# Patient Record
Sex: MALE | Race: WHITE | NOT HISPANIC OR LATINO
[De-identification: names, ages, dates, MRNs, and addresses within clinical notes are randomized per-mention and may not be internally consistent; named-entity substitution may affect disease eponyms.]

---

## 2017-02-03 ENCOUNTER — APPOINTMENT (OUTPATIENT)
Dept: PULMONOLOGY | Facility: CLINIC | Age: 67
End: 2017-02-03

## 2017-02-03 VITALS
RESPIRATION RATE: 15 BRPM | BODY MASS INDEX: 27.77 KG/M2 | SYSTOLIC BLOOD PRESSURE: 110 MMHG | OXYGEN SATURATION: 98 % | HEIGHT: 70 IN | HEART RATE: 63 BPM | DIASTOLIC BLOOD PRESSURE: 70 MMHG | WEIGHT: 194 LBS

## 2017-02-03 DIAGNOSIS — Z78.9 OTHER SPECIFIED HEALTH STATUS: ICD-10-CM

## 2017-02-03 DIAGNOSIS — Z80.9 FAMILY HISTORY OF MALIGNANT NEOPLASM, UNSPECIFIED: ICD-10-CM

## 2017-02-03 DIAGNOSIS — Z83.49 FAMILY HISTORY OF OTHER ENDOCRINE, NUTRITIONAL AND METABOLIC DISEASES: ICD-10-CM

## 2017-02-03 DIAGNOSIS — Z82.49 FAMILY HISTORY OF ISCHEMIC HEART DISEASE AND OTHER DISEASES OF THE CIRCULATORY SYSTEM: ICD-10-CM

## 2017-02-04 LAB
BASOPHILS # BLD AUTO: 0.11 K/UL
BASOPHILS NFR BLD AUTO: 1.5 %
EOSINOPHIL # BLD AUTO: 0.35 K/UL
EOSINOPHIL NFR BLD AUTO: 4.7 %
HCT VFR BLD CALC: 44.5 %
HGB BLD-MCNC: 14.6 G/DL
IGE SER-MCNC: <1 IU/ML
IMM GRANULOCYTES NFR BLD AUTO: 0.5 %
LYMPHOCYTES # BLD AUTO: 2.71 K/UL
LYMPHOCYTES NFR BLD AUTO: 36.2 %
MAN DIFF?: NORMAL
MCHC RBC-ENTMCNC: 32.5 PG
MCHC RBC-ENTMCNC: 32.8 GM/DL
MCV RBC AUTO: 99.1 FL
MONOCYTES # BLD AUTO: 0.71 K/UL
MONOCYTES NFR BLD AUTO: 9.5 %
NEUTROPHILS # BLD AUTO: 3.57 K/UL
NEUTROPHILS NFR BLD AUTO: 47.6 %
PLATELET # BLD AUTO: 383 K/UL
RBC # BLD: 4.49 M/UL
RBC # FLD: 14 %
T3FREE SERPL-MCNC: 2.71 PG/ML
T4 FREE SERPL-MCNC: 1.4 NG/DL
TSH SERPL-ACNC: 3.13 UIU/ML
WBC # FLD AUTO: 7.49 K/UL

## 2017-02-05 ENCOUNTER — RX RENEWAL (OUTPATIENT)
Age: 67
End: 2017-02-05

## 2017-02-05 LAB
24R-OH-CALCIDIOL SERPL-MCNC: 68 PG/ML
25(OH)D3 SERPL-MCNC: 20.3 NG/ML

## 2017-02-09 LAB
TESTOST BND SERPL-MCNC: 5.1 PG/ML
TESTOST SERPL-MCNC: 312.8 NG/DL

## 2017-02-13 ENCOUNTER — MEDICATION RENEWAL (OUTPATIENT)
Age: 67
End: 2017-02-13

## 2017-02-24 ENCOUNTER — APPOINTMENT (OUTPATIENT)
Dept: PULMONOLOGY | Facility: CLINIC | Age: 67
End: 2017-02-24

## 2017-02-28 ENCOUNTER — RESULT REVIEW (OUTPATIENT)
Age: 67
End: 2017-02-28

## 2017-02-28 ENCOUNTER — MEDICATION RENEWAL (OUTPATIENT)
Age: 67
End: 2017-02-28

## 2017-03-06 ENCOUNTER — APPOINTMENT (OUTPATIENT)
Dept: SLEEP CENTER | Facility: CLINIC | Age: 67
End: 2017-03-06

## 2017-03-06 ENCOUNTER — OUTPATIENT (OUTPATIENT)
Dept: OUTPATIENT SERVICES | Facility: HOSPITAL | Age: 67
LOS: 1 days | End: 2017-03-06
Payer: COMMERCIAL

## 2017-03-06 PROCEDURE — G0400: CPT

## 2017-03-10 DIAGNOSIS — G47.33 OBSTRUCTIVE SLEEP APNEA (ADULT) (PEDIATRIC): ICD-10-CM

## 2017-03-28 ENCOUNTER — APPOINTMENT (OUTPATIENT)
Dept: UROLOGY | Facility: CLINIC | Age: 67
End: 2017-03-28

## 2017-04-06 ENCOUNTER — APPOINTMENT (OUTPATIENT)
Dept: PULMONOLOGY | Facility: CLINIC | Age: 67
End: 2017-04-06

## 2017-04-06 VITALS
SYSTOLIC BLOOD PRESSURE: 123 MMHG | WEIGHT: 194 LBS | RESPIRATION RATE: 14 BRPM | HEART RATE: 67 BPM | HEIGHT: 70 IN | DIASTOLIC BLOOD PRESSURE: 70 MMHG | OXYGEN SATURATION: 96 % | BODY MASS INDEX: 27.77 KG/M2

## 2017-04-11 ENCOUNTER — RX RENEWAL (OUTPATIENT)
Age: 67
End: 2017-04-11

## 2017-04-19 ENCOUNTER — APPOINTMENT (OUTPATIENT)
Dept: HEMATOLOGY ONCOLOGY | Facility: CLINIC | Age: 67
End: 2017-04-19

## 2017-04-19 VITALS
OXYGEN SATURATION: 97 % | HEART RATE: 64 BPM | WEIGHT: 196 LBS | DIASTOLIC BLOOD PRESSURE: 56 MMHG | BODY MASS INDEX: 28.12 KG/M2 | TEMPERATURE: 97.9 F | SYSTOLIC BLOOD PRESSURE: 118 MMHG | RESPIRATION RATE: 12 BRPM

## 2017-04-21 LAB
ALBUMIN SERPL ELPH-MCNC: 4.7 G/DL
ALP BLD-CCNC: 57 U/L
ALT SERPL-CCNC: 28 U/L
ANION GAP SERPL CALC-SCNC: 18 MMOL/L
AST SERPL-CCNC: 25 U/L
BILIRUB SERPL-MCNC: 0.4 MG/DL
BUN SERPL-MCNC: 16 MG/DL
CALCIUM SERPL-MCNC: 10.7 MG/DL
CHLORIDE SERPL-SCNC: 101 MMOL/L
CO2 SERPL-SCNC: 24 MMOL/L
CREAT SERPL-MCNC: 0.84 MG/DL
GLUCOSE SERPL-MCNC: 80 MG/DL
LDH SERPL-CCNC: 182 U/L
POTASSIUM SERPL-SCNC: 4.6 MMOL/L
PROT SERPL-MCNC: 7.1 G/DL
SODIUM SERPL-SCNC: 143 MMOL/L
URATE SERPL-MCNC: 6.9 MG/DL

## 2017-06-18 ENCOUNTER — LABORATORY RESULT (OUTPATIENT)
Age: 67
End: 2017-06-18

## 2017-06-18 ENCOUNTER — RESULT REVIEW (OUTPATIENT)
Age: 67
End: 2017-06-18

## 2017-06-19 ENCOUNTER — APPOINTMENT (OUTPATIENT)
Dept: DERMATOLOGY | Facility: CLINIC | Age: 67
End: 2017-06-19

## 2017-06-19 VITALS — DIASTOLIC BLOOD PRESSURE: 82 MMHG | SYSTOLIC BLOOD PRESSURE: 132 MMHG

## 2017-06-19 DIAGNOSIS — L73.8 OTHER SPECIFIED FOLLICULAR DISORDERS: ICD-10-CM

## 2017-06-19 DIAGNOSIS — D48.5 NEOPLASM OF UNCERTAIN BEHAVIOR OF SKIN: ICD-10-CM

## 2017-06-19 RX ORDER — HYDROCODONE BITARTRATE AND HOMATROPINE METHYLBROMIDE 5; 1.5 MG/5ML; MG/5ML
5-1.5 SYRUP ORAL
Qty: 473 | Refills: 0 | Status: DISCONTINUED | COMMUNITY
Start: 2017-04-11 | End: 2017-06-19

## 2017-06-19 RX ORDER — ERGOCALCIFEROL 1.25 MG/1
1.25 MG CAPSULE, LIQUID FILLED ORAL
Qty: 6 | Refills: 1 | Status: DISCONTINUED | COMMUNITY
Start: 2017-02-05 | End: 2017-06-19

## 2017-06-19 RX ORDER — DESLORATADINE 5 MG/1
5 TABLET ORAL DAILY
Qty: 90 | Refills: 1 | Status: DISCONTINUED | COMMUNITY
Start: 2017-04-06 | End: 2017-06-19

## 2017-06-19 RX ORDER — RANITIDINE HYDROCHLORIDE 300 MG/1
300 TABLET, FILM COATED ORAL
Qty: 3 | Refills: 1 | Status: DISCONTINUED | COMMUNITY
Start: 2017-02-03 | End: 2017-06-19

## 2017-06-19 RX ORDER — AZITHROMYCIN 250 MG/1
250 TABLET, FILM COATED ORAL
Qty: 6 | Refills: 0 | Status: DISCONTINUED | COMMUNITY
Start: 2016-03-05 | End: 2017-06-19

## 2017-06-19 RX ORDER — BUDESONIDE 180 UG/1
180 AEROSOL, POWDER RESPIRATORY (INHALATION) TWICE DAILY
Qty: 3 | Refills: 1 | Status: DISCONTINUED | COMMUNITY
Start: 2017-04-06 | End: 2017-06-19

## 2017-06-19 RX ORDER — ALBUTEROL SULFATE 90 UG/1
108 (90 BASE) AEROSOL, METERED RESPIRATORY (INHALATION)
Qty: 1 | Refills: 1 | Status: DISCONTINUED | COMMUNITY
Start: 2017-02-28 | End: 2017-06-19

## 2017-06-19 RX ORDER — HYDROCODONE BITARTRATE AND HOMATROPINE METHYLBROMIDE 5; 1.5 MG/5ML; MG/5ML
5-1.5 SYRUP ORAL
Qty: 473 | Refills: 0 | Status: DISCONTINUED | COMMUNITY
Start: 2017-04-06 | End: 2017-06-19

## 2017-06-19 RX ORDER — DESLORATADINE 5 MG/1
5 TABLET ORAL DAILY
Qty: 90 | Refills: 1 | Status: DISCONTINUED | COMMUNITY
Start: 2017-02-03 | End: 2017-06-19

## 2017-09-11 ENCOUNTER — APPOINTMENT (OUTPATIENT)
Dept: INTERNAL MEDICINE | Facility: CLINIC | Age: 67
End: 2017-09-11

## 2017-09-19 ENCOUNTER — APPOINTMENT (OUTPATIENT)
Dept: PULMONOLOGY | Facility: CLINIC | Age: 67
End: 2017-09-19
Payer: COMMERCIAL

## 2017-09-19 VITALS
SYSTOLIC BLOOD PRESSURE: 124 MMHG | DIASTOLIC BLOOD PRESSURE: 70 MMHG | RESPIRATION RATE: 17 BRPM | BODY MASS INDEX: 27.77 KG/M2 | OXYGEN SATURATION: 95 % | HEART RATE: 66 BPM | HEIGHT: 70 IN | WEIGHT: 194 LBS

## 2017-09-19 PROCEDURE — 94010 BREATHING CAPACITY TEST: CPT

## 2017-09-19 PROCEDURE — 99214 OFFICE O/P EST MOD 30 MIN: CPT | Mod: 25

## 2017-09-23 LAB
T3FREE SERPL-MCNC: 2.73 PG/ML
T4 FREE SERPL-MCNC: 1.5 NG/DL
TSH SERPL-ACNC: 3.04 UIU/ML

## 2017-09-24 LAB
24R-OH-CALCIDIOL SERPL-MCNC: 50.6 PG/ML
25(OH)D3 SERPL-MCNC: 33.7 NG/ML

## 2017-09-26 LAB
TESTOST BND SERPL-MCNC: 6.9 PG/ML
TESTOST SERPL-MCNC: 370.9 NG/DL

## 2017-09-27 ENCOUNTER — RX RENEWAL (OUTPATIENT)
Age: 67
End: 2017-09-27

## 2017-09-27 ENCOUNTER — APPOINTMENT (OUTPATIENT)
Dept: INTERNAL MEDICINE | Facility: CLINIC | Age: 67
End: 2017-09-27
Payer: COMMERCIAL

## 2017-09-27 VITALS
SYSTOLIC BLOOD PRESSURE: 140 MMHG | WEIGHT: 196 LBS | OXYGEN SATURATION: 99 % | DIASTOLIC BLOOD PRESSURE: 80 MMHG | HEIGHT: 70 IN | HEART RATE: 69 BPM | BODY MASS INDEX: 28.06 KG/M2

## 2017-09-27 PROCEDURE — 90670 PCV13 VACCINE IM: CPT

## 2017-09-27 PROCEDURE — 99387 INIT PM E/M NEW PAT 65+ YRS: CPT | Mod: 25

## 2017-09-27 PROCEDURE — G0008: CPT

## 2017-09-27 PROCEDURE — 90662 IIV NO PRSV INCREASED AG IM: CPT

## 2017-09-27 PROCEDURE — 90472 IMMUNIZATION ADMIN EACH ADD: CPT

## 2017-10-04 LAB
CHOLEST SERPL-MCNC: 177 MG/DL
CHOLEST/HDLC SERPL: 5.5 RATIO
HBA1C MFR BLD HPLC: 5.4 %
HDLC SERPL-MCNC: 32 MG/DL
LDLC SERPL CALC-MCNC: 110 MG/DL
PSA SERPL-MCNC: 1.59 NG/ML
TRIGL SERPL-MCNC: 175 MG/DL

## 2017-10-18 ENCOUNTER — APPOINTMENT (OUTPATIENT)
Dept: HEMATOLOGY ONCOLOGY | Facility: CLINIC | Age: 67
End: 2017-10-18
Payer: COMMERCIAL

## 2017-10-18 ENCOUNTER — LABORATORY RESULT (OUTPATIENT)
Age: 67
End: 2017-10-18

## 2017-10-18 VITALS
SYSTOLIC BLOOD PRESSURE: 135 MMHG | OXYGEN SATURATION: 97 % | BODY MASS INDEX: 28.12 KG/M2 | DIASTOLIC BLOOD PRESSURE: 87 MMHG | TEMPERATURE: 98.2 F | HEART RATE: 72 BPM | WEIGHT: 196 LBS | RESPIRATION RATE: 14 BRPM

## 2017-10-18 PROCEDURE — 99215 OFFICE O/P EST HI 40 MIN: CPT

## 2017-10-18 PROCEDURE — 85025 COMPLETE CBC W/AUTO DIFF WBC: CPT

## 2017-10-19 LAB
ALBUMIN SERPL ELPH-MCNC: 4.8 G/DL
ALP BLD-CCNC: 53 U/L
ALT SERPL-CCNC: 39 U/L
ANION GAP SERPL CALC-SCNC: 15 MMOL/L
AST SERPL-CCNC: 25 U/L
BILIRUB SERPL-MCNC: 0.5 MG/DL
BUN SERPL-MCNC: 18 MG/DL
CALCIUM SERPL-MCNC: 11 MG/DL
CHLORIDE SERPL-SCNC: 103 MMOL/L
CO2 SERPL-SCNC: 26 MMOL/L
CREAT SERPL-MCNC: 0.97 MG/DL
GLUCOSE SERPL-MCNC: 105 MG/DL
LDH SERPL-CCNC: 168 U/L
POTASSIUM SERPL-SCNC: 4.8 MMOL/L
PROT SERPL-MCNC: 7.4 G/DL
SODIUM SERPL-SCNC: 144 MMOL/L
URATE SERPL-MCNC: 8.4 MG/DL

## 2017-10-23 LAB — PTH RELATED PROT SERPL-MCNC: <1.1 PMOL/L

## 2017-11-09 ENCOUNTER — APPOINTMENT (OUTPATIENT)
Dept: PULMONOLOGY | Facility: CLINIC | Age: 67
End: 2017-11-09

## 2017-11-21 ENCOUNTER — APPOINTMENT (OUTPATIENT)
Dept: INTERNAL MEDICINE | Facility: CLINIC | Age: 67
End: 2017-11-21
Payer: COMMERCIAL

## 2017-11-21 VITALS
HEART RATE: 74 BPM | SYSTOLIC BLOOD PRESSURE: 134 MMHG | WEIGHT: 196 LBS | OXYGEN SATURATION: 97 % | DIASTOLIC BLOOD PRESSURE: 82 MMHG | BODY MASS INDEX: 28.06 KG/M2 | HEIGHT: 70 IN

## 2017-11-21 DIAGNOSIS — M25.519 PAIN IN UNSPECIFIED SHOULDER: ICD-10-CM

## 2017-11-21 DIAGNOSIS — Z87.898 PERSONAL HISTORY OF OTHER SPECIFIED CONDITIONS: ICD-10-CM

## 2017-11-21 DIAGNOSIS — Z92.29 PERSONAL HISTORY OF OTHER DRUG THERAPY: ICD-10-CM

## 2017-11-21 DIAGNOSIS — G47.30 SLEEP APNEA, UNSPECIFIED: ICD-10-CM

## 2017-11-21 DIAGNOSIS — Z87.09 PERSONAL HISTORY OF OTHER DISEASES OF THE RESPIRATORY SYSTEM: ICD-10-CM

## 2017-11-21 DIAGNOSIS — J40 BRONCHITIS, NOT SPECIFIED AS ACUTE OR CHRONIC: ICD-10-CM

## 2017-11-21 DIAGNOSIS — L85.3 XEROSIS CUTIS: ICD-10-CM

## 2017-11-21 DIAGNOSIS — Z87.39 PERSONAL HISTORY OF OTHER DISEASES OF THE MUSCULOSKELETAL SYSTEM AND CONNECTIVE TISSUE: ICD-10-CM

## 2017-11-21 DIAGNOSIS — Z12.83 ENCOUNTER FOR SCREENING FOR MALIGNANT NEOPLASM OF SKIN: ICD-10-CM

## 2017-11-21 DIAGNOSIS — T88.7XXA UNSPECIFIED ADVERSE EFFECT OF DRUG OR MEDICAMENT, INITIAL ENCOUNTER: ICD-10-CM

## 2017-11-21 PROCEDURE — 99213 OFFICE O/P EST LOW 20 MIN: CPT

## 2017-12-21 ENCOUNTER — APPOINTMENT (OUTPATIENT)
Dept: PULMONOLOGY | Facility: CLINIC | Age: 67
End: 2017-12-21
Payer: COMMERCIAL

## 2017-12-21 VITALS
OXYGEN SATURATION: 97 % | HEART RATE: 67 BPM | WEIGHT: 197 LBS | RESPIRATION RATE: 16 BRPM | DIASTOLIC BLOOD PRESSURE: 70 MMHG | HEIGHT: 67 IN | BODY MASS INDEX: 30.92 KG/M2 | SYSTOLIC BLOOD PRESSURE: 130 MMHG

## 2017-12-21 DIAGNOSIS — Z87.891 PERSONAL HISTORY OF NICOTINE DEPENDENCE: ICD-10-CM

## 2017-12-21 PROCEDURE — 99214 OFFICE O/P EST MOD 30 MIN: CPT

## 2018-01-10 ENCOUNTER — RX RENEWAL (OUTPATIENT)
Age: 68
End: 2018-01-10

## 2018-01-10 RX ORDER — METHYLPREDNISOLONE 4 MG/1
4 TABLET ORAL
Qty: 1 | Refills: 0 | Status: DISCONTINUED | COMMUNITY
Start: 2017-12-27 | End: 2018-01-10

## 2018-01-10 RX ORDER — METHYLPREDNISOLONE 4 MG/1
4 TABLET ORAL
Qty: 1 | Refills: 0 | Status: DISCONTINUED | COMMUNITY
Start: 2017-12-21 | End: 2018-01-10

## 2018-01-19 ENCOUNTER — APPOINTMENT (OUTPATIENT)
Dept: PULMONOLOGY | Facility: CLINIC | Age: 68
End: 2018-01-19
Payer: COMMERCIAL

## 2018-01-19 VITALS
WEIGHT: 198 LBS | HEIGHT: 67 IN | SYSTOLIC BLOOD PRESSURE: 126 MMHG | HEART RATE: 67 BPM | BODY MASS INDEX: 31.08 KG/M2 | DIASTOLIC BLOOD PRESSURE: 80 MMHG | OXYGEN SATURATION: 97 %

## 2018-01-19 DIAGNOSIS — Z87.09 PERSONAL HISTORY OF OTHER DISEASES OF THE RESPIRATORY SYSTEM: ICD-10-CM

## 2018-01-19 PROCEDURE — 99214 OFFICE O/P EST MOD 30 MIN: CPT | Mod: 25

## 2018-01-19 PROCEDURE — 94010 BREATHING CAPACITY TEST: CPT

## 2018-01-19 RX ORDER — BECLOMETHASONE DIPROPIONATE 80 UG/1
80 AEROSOL, METERED RESPIRATORY (INHALATION) TWICE DAILY
Qty: 3 | Refills: 1 | Status: ACTIVE | COMMUNITY
Start: 2018-01-19 | End: 1900-01-01

## 2018-04-18 ENCOUNTER — APPOINTMENT (OUTPATIENT)
Dept: HEMATOLOGY ONCOLOGY | Facility: CLINIC | Age: 68
End: 2018-04-18
Payer: COMMERCIAL

## 2018-04-18 VITALS
BODY MASS INDEX: 30.7 KG/M2 | HEART RATE: 69 BPM | OXYGEN SATURATION: 97 % | DIASTOLIC BLOOD PRESSURE: 82 MMHG | TEMPERATURE: 97.8 F | RESPIRATION RATE: 14 BRPM | SYSTOLIC BLOOD PRESSURE: 138 MMHG | WEIGHT: 196 LBS

## 2018-04-18 PROCEDURE — 85025 COMPLETE CBC W/AUTO DIFF WBC: CPT

## 2018-04-18 PROCEDURE — 99215 OFFICE O/P EST HI 40 MIN: CPT

## 2018-04-18 RX ORDER — AZITHROMYCIN 500 MG/1
500 TABLET, FILM COATED ORAL
Qty: 5 | Refills: 0 | Status: DISCONTINUED | COMMUNITY
Start: 2017-12-21 | End: 2018-04-18

## 2018-04-18 RX ORDER — ASPARTIC ACID
POWDER (GRAM) MISCELLANEOUS
Refills: 0 | Status: ACTIVE | COMMUNITY

## 2018-04-18 RX ORDER — CARBINOXAMINE MALEATE 4 MG/1
4 TABLET ORAL
Qty: 270 | Refills: 1 | Status: DISCONTINUED | COMMUNITY
Start: 2018-01-19 | End: 2018-04-18

## 2018-04-18 RX ORDER — HYDROCODONE BITARTRATE AND HOMATROPINE METHYLBROMIDE 5; 1.5 MG/5ML; MG/5ML
5-1.5 SYRUP ORAL
Qty: 1 | Refills: 0 | Status: DISCONTINUED | COMMUNITY
Start: 2017-09-19 | End: 2018-04-18

## 2018-04-18 RX ORDER — HYDROCODONE BITARTRATE AND HOMATROPINE METHYLBROMIDE 5; 1.5 MG/5ML; MG/5ML
5-1.5 SYRUP ORAL
Qty: 800 | Refills: 0 | Status: DISCONTINUED | COMMUNITY
Start: 2018-01-10 | End: 2018-04-18

## 2018-04-18 RX ORDER — AZELASTINE HYDROCHLORIDE AND FLUTICASONE PROPIONATE 137; 50 UG/1; UG/1
137-50 SPRAY, METERED NASAL
Qty: 3 | Refills: 1 | Status: DISCONTINUED | COMMUNITY
Start: 2017-09-19 | End: 2018-04-18

## 2018-04-18 RX ORDER — ZN/PYG AFRI/NETTLE/SAW PALMET
CAPSULE ORAL
Refills: 0 | Status: ACTIVE | COMMUNITY

## 2018-04-18 RX ORDER — FLUTICASONE PROPIONATE 50 UG/1
50 SPRAY, METERED NASAL TWICE DAILY
Qty: 3 | Refills: 1 | Status: DISCONTINUED | COMMUNITY
Start: 2017-09-27 | End: 2018-04-18

## 2018-04-18 RX ORDER — ERGOCALCIFEROL 1.25 MG/1
1.25 MG CAPSULE, LIQUID FILLED ORAL
Qty: 6 | Refills: 1 | Status: DISCONTINUED | COMMUNITY
Start: 2018-01-19 | End: 2018-04-18

## 2018-04-18 RX ORDER — OSELTAMIVIR PHOSPHATE 75 MG/1
75 CAPSULE ORAL TWICE DAILY
Qty: 10 | Refills: 0 | Status: DISCONTINUED | COMMUNITY
Start: 2018-01-19 | End: 2018-04-18

## 2018-04-18 RX ORDER — COLD-HOT PACK
EACH MISCELLANEOUS
Refills: 0 | Status: ACTIVE | COMMUNITY

## 2018-04-23 LAB
ALBUMIN SERPL ELPH-MCNC: 4.7 G/DL
ALP BLD-CCNC: 55 U/L
ALT SERPL-CCNC: 37 U/L
ANION GAP SERPL CALC-SCNC: 11 MMOL/L
AST SERPL-CCNC: 26 U/L
BILIRUB SERPL-MCNC: 0.5 MG/DL
BUN SERPL-MCNC: 16 MG/DL
CALCIUM SERPL-MCNC: 11.5 MG/DL
CHLORIDE SERPL-SCNC: 103 MMOL/L
CO2 SERPL-SCNC: 29 MMOL/L
CREAT SERPL-MCNC: 0.97 MG/DL
GLUCOSE SERPL-MCNC: 103 MG/DL
LDH SERPL-CCNC: 169 U/L
POTASSIUM SERPL-SCNC: 5.9 MMOL/L
PROT SERPL-MCNC: 7.2 G/DL
SODIUM SERPL-SCNC: 143 MMOL/L
URATE SERPL-MCNC: 7.5 MG/DL

## 2018-04-24 ENCOUNTER — APPOINTMENT (OUTPATIENT)
Dept: HEMATOLOGY ONCOLOGY | Facility: CLINIC | Age: 68
End: 2018-04-24

## 2018-04-25 LAB
ALBUMIN SERPL ELPH-MCNC: 4.5 G/DL
ALP BLD-CCNC: 48 U/L
ALT SERPL-CCNC: 33 U/L
ANION GAP SERPL CALC-SCNC: 13 MMOL/L
AST SERPL-CCNC: 23 U/L
BILIRUB SERPL-MCNC: 0.5 MG/DL
BUN SERPL-MCNC: 13 MG/DL
CALCIUM SERPL-MCNC: 10.8 MG/DL
CHLORIDE SERPL-SCNC: 103 MMOL/L
CO2 SERPL-SCNC: 27 MMOL/L
CREAT SERPL-MCNC: 0.86 MG/DL
GLUCOSE SERPL-MCNC: 90 MG/DL
POTASSIUM SERPL-SCNC: 5.5 MMOL/L
PROT SERPL-MCNC: 7 G/DL
SODIUM SERPL-SCNC: 143 MMOL/L

## 2018-05-16 ENCOUNTER — RX RENEWAL (OUTPATIENT)
Age: 68
End: 2018-05-16

## 2018-05-22 ENCOUNTER — RX RENEWAL (OUTPATIENT)
Age: 68
End: 2018-05-22

## 2018-07-13 ENCOUNTER — RESULT REVIEW (OUTPATIENT)
Age: 68
End: 2018-07-13

## 2018-07-17 ENCOUNTER — APPOINTMENT (OUTPATIENT)
Dept: ORTHOPEDIC SURGERY | Facility: CLINIC | Age: 68
End: 2018-07-17
Payer: COMMERCIAL

## 2018-07-17 ENCOUNTER — RX RENEWAL (OUTPATIENT)
Age: 68
End: 2018-07-17

## 2018-07-17 ENCOUNTER — TRANSCRIPTION ENCOUNTER (OUTPATIENT)
Age: 68
End: 2018-07-17

## 2018-07-17 VITALS
WEIGHT: 190 LBS | HEIGHT: 67 IN | SYSTOLIC BLOOD PRESSURE: 148 MMHG | DIASTOLIC BLOOD PRESSURE: 68 MMHG | BODY MASS INDEX: 29.82 KG/M2 | HEART RATE: 62 BPM

## 2018-07-17 PROCEDURE — 99214 OFFICE O/P EST MOD 30 MIN: CPT

## 2018-07-17 PROCEDURE — 73564 X-RAY EXAM KNEE 4 OR MORE: CPT | Mod: RT

## 2018-07-17 RX ORDER — HYDROCODONE BITARTRATE AND HOMATROPINE METHYLBROMIDE 5; 1.5 MG/5ML; MG/5ML
5-1.5 SYRUP ORAL
Qty: 1500 | Refills: 0 | Status: ACTIVE | COMMUNITY
Start: 2018-07-17 | End: 1900-01-01

## 2018-09-11 ENCOUNTER — RX RENEWAL (OUTPATIENT)
Age: 68
End: 2018-09-11

## 2018-09-27 ENCOUNTER — NON-APPOINTMENT (OUTPATIENT)
Age: 68
End: 2018-09-27

## 2018-09-27 ENCOUNTER — APPOINTMENT (OUTPATIENT)
Dept: PULMONOLOGY | Facility: CLINIC | Age: 68
End: 2018-09-27
Payer: COMMERCIAL

## 2018-09-27 VITALS
OXYGEN SATURATION: 97 % | SYSTOLIC BLOOD PRESSURE: 130 MMHG | HEIGHT: 70 IN | BODY MASS INDEX: 28.2 KG/M2 | HEART RATE: 66 BPM | WEIGHT: 197 LBS | DIASTOLIC BLOOD PRESSURE: 80 MMHG

## 2018-09-27 PROCEDURE — 99214 OFFICE O/P EST MOD 30 MIN: CPT | Mod: 25

## 2018-09-27 PROCEDURE — 94010 BREATHING CAPACITY TEST: CPT

## 2018-09-27 RX ORDER — OLOPATADINE HYDROCHLORIDE 665 UG/1
0.6 SPRAY, METERED NASAL
Qty: 3 | Refills: 1 | Status: ACTIVE | COMMUNITY
Start: 2018-09-27 | End: 1900-01-01

## 2018-09-27 NOTE — ADDENDUM
[FreeTextEntry1] : All medical record entries made by enmanuel Salmeron were at Dr. Jose Martin Angulo's, direction and personally dictated by me on (9/27/2018). I have reviewed the chart and agree that the record accurately reflects my personal performance of the history, physical exam, assessment and plan. I have also personally directed, reviewed, and agree with the discharge instructions.

## 2018-09-27 NOTE — HISTORY OF PRESENT ILLNESS
[FreeTextEntry1] : Mr. Acosta is a 68 year old male with a history of allergic rhinitis, cough, asthma, eosinophilia, GERD, low vitamin D/testosterone, non allergic rhinitis, PND, sinus congestion, sleep apnea and viral sinusitis presenting to the office today for a follow up visit. Her chief complaint is allergy symptoms\par -he reports that this past summer season he has been struggling with his allergies, as he has had a frequent post nasal drip and cough\par -he reports that he has been trying to exercise at the gym regularly with no limitations\par -he notes that he has been using his cough medicine at night, however he typically coughs the most during the morning\par -he states that his bowels have been regular\par -he reports that his sense of taste \par -he reports that he has been sleeping well, and that he has been using his oral appliance\par -he states that his weight has been stable\par -he states that his nasal sprays have not been relieving his nasal congestion \par -he denies any headaches, nausea, vomiting, fever, chills, sweats, chest pain, chest pressure, diarrhea, constipation, dysphagia, dizziness, leg swelling, leg pain, itchy eyes, itchy ears, heartburn, reflux, or sour taste in the mouth.

## 2018-09-27 NOTE — REASON FOR VISIT
[Follow-Up] : a follow-up visit [FreeTextEntry1] : allergic rhinitis, cough, asthma, eosinophilia, GERD, low vitamin D/testosterone, non allergic rhinitis, PND, sinus congestion, sleep apnea and viral sinusitis

## 2018-09-27 NOTE — PROCEDURE
[FreeTextEntry1] : PFT - spi reveals normal flows; FEV1 is 3.48 which is 102% of predicted, abnormal inspiratory limb

## 2018-09-27 NOTE — REVIEW OF SYSTEMS
[Negative] : Sleep Disorder [Nasal Congestion] : nasal congestion [As Noted in HPI] : as noted in HPI [Cough] : cough

## 2018-09-27 NOTE — ASSESSMENT
[FreeTextEntry1] : Mr. Acosta is a 68 year old male with a history of RADs / allergy sinus / CLL / GERD  who presents to the office for a follow up visit. \par \par His shortness of breath (quiet) is multifactorial due to:\par -post nasal drip syndrome\par -RADS\par -eosinophilic bronchitis\par -GERD\par -CLL: f/u with Dr. Martin and Dr. Trev Gastelum\par \par problem 1: post nasal drip\par -continue to use saline\par -add Olopatadine 0.6% 1 sniff BID \par -add Flonase 1 sniff/nostril BID \par -continue Clarinex 5 mg before bed\par -recommended to use "Navage" nasal rinse device \par -continue Palgic 4 mg up to TID \par \par -Environmental measures for allergies were encouraged including mattress and pillow cover, air purifier, and environmental controls.\par \par problem 2: RADS\par -continue to use Ventolin PRN and before exercise\par \par -Inhaler technique reviewed as well as oral hygiene techniques reviewed with patient. Avoidance of cold air, extremes of temperature, rescue inhaler should be used before exercise. Order of medication reviewed with patient. Recommended use of a cool mist humidifier in the bedroom. 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 It sometimes develops following a URI, herpes zoster outbreak in pharynx or thyroid or cervical spine injury. However, many patients have no identifiable antecedent explanation. \par Any cough greater than three weeks duration-differential diagnosis includes-asthma, upper airway cough syndrome, post nasal drip syndrome, gastroesophageal reflux, laryngopharyngeal reflux, cardiac disease (congestive heart failure, medicines, effects, etc), medication effects (b-blockers, ace inhibitors, ARBs, glaucoma meds, etc.), smoking, infectious, multifactorial, etc \par \par problem 4: eosinophilic bronchitis\par -being recommended to use Ventolin PRN and before exercise/Symbicort\par -continue Symbicort 160 at 2 inhalations BID prn\par -continue QVar 80 at 2 inhalations BID\par \par problem 5: GERD\par -continue to use Zantac 300 mg QHS\par \par -Rule of 2s: avoid eating too much, eating too late, eating too spicy, eating two hours before bed\par -Things to avoid including overeating, spicy foods, tight clothing, eating within three hours of bed, this list is not all inclusive. \par -For treatment of reflux, possible options discussed including diet control, H2 blockers, PPIs, as well as coating motility agents discussed as treatment options. Timing of meals and proximity of last meal to sleep were discussed. If symptoms persist, a formal gastrointestinal evaluation is needed.\par \par problem 6: low vitamin D\par -continue to use supplemental vitamin D supplementation\par \par -Has been associated with asthma exacerbations and increased allergic symptoms. The goal based on recent information is maintaining levels between 50-70 and low normal is 30. Recommended 50,000 units every two weeks to once a month depending on the level. \par \par problem 7: OSAS\par -sleep apnea revealed moderate sleep apnea\par -referred to Dr. Sivakumar Casarez and Dr. Okeefe for an oral appliance \par \par -Treatment options discussed including CPAP/BiPAP machine, oral appliance, ProVent therapy, Oxy-Aid by Respitec, new technologies, or positional sleep.Recommended use of the CPAP machine for moderate (AHI >15), moderate to severe (AHI 15-30) and severe patients (AHI > 30). Recommended weight loss which can reduce AHI especially in weight loss of greater than 5% of BMI. Positional sleep is recommended in those with low AHI, low-moderate BMI, and younger age. For severe sleep apnea, the hypoglossal nerve stimulator was recommended as well. \par -Sleep apnea is associated with adverse clinical consequences which an affect most organ systems.  Cardiovascular disease risk includes arrhythmias, atrial fibrillation, hypertension, coronary artery disease, and stroke. Metabolic disorders include diabetes type 2, non-alcoholic fatty liver disease. Mood disorder especially depression; and cognitive decline especially in the elderly. Associations with  chronic reflux/Kyle’s esophagus some but not all inclusive. \par -Reasons to assess this include arousal consistent with hypopnea; respiratory events most prominent in REM sleep or supine position; therefore sleep staging and body position are important for accurate diagnosis and estimation of AHI. \par \par problem 8: overweight\par -Weight loss, exercise, and diet control were discussed and are highly encouraged. Treatment options were given such as, aqua therapy, and contacting a nutritionist. Recommended to use the elliptical, stationary bike, less use of treadmill.  Obesity is associated with worsening asthma, shortness of breath, and potential for cardiac disease, diabetes, and other underlying medical conditions. \par \par problem 9: health maintenance \par -recommended yearly flu shot after October 15\par -recommended strep pneumonia vaccines: Prevnar-13 vaccine, followed by Pneumo vaccine 23 one year following\par -recommended early intervention for URIs\par -recommended regular osteoporosis evaluations\par -recommended early dermatological evaluations\par -recommended after the age of 50 to the age of 70, colonoscopy every 5 years \par \par F/U in 6 months\par He is encouraged to call with any changes, concerns, or questions.

## 2018-10-02 ENCOUNTER — OTHER (OUTPATIENT)
Age: 68
End: 2018-10-02

## 2018-10-03 ENCOUNTER — LABORATORY RESULT (OUTPATIENT)
Age: 68
End: 2018-10-03

## 2018-10-03 ENCOUNTER — APPOINTMENT (OUTPATIENT)
Dept: HEMATOLOGY ONCOLOGY | Facility: CLINIC | Age: 68
End: 2018-10-03
Payer: COMMERCIAL

## 2018-10-03 VITALS
OXYGEN SATURATION: 97 % | RESPIRATION RATE: 14 BRPM | SYSTOLIC BLOOD PRESSURE: 124 MMHG | BODY MASS INDEX: 28.12 KG/M2 | HEART RATE: 61 BPM | DIASTOLIC BLOOD PRESSURE: 72 MMHG | WEIGHT: 196 LBS

## 2018-10-03 PROCEDURE — 85025 COMPLETE CBC W/AUTO DIFF WBC: CPT

## 2018-10-03 PROCEDURE — 99215 OFFICE O/P EST HI 40 MIN: CPT

## 2018-10-15 ENCOUNTER — TRANSCRIPTION ENCOUNTER (OUTPATIENT)
Age: 68
End: 2018-10-15

## 2018-10-17 ENCOUNTER — FORM ENCOUNTER (OUTPATIENT)
Age: 68
End: 2018-10-17

## 2018-10-18 ENCOUNTER — OUTPATIENT (OUTPATIENT)
Dept: OUTPATIENT SERVICES | Facility: HOSPITAL | Age: 68
LOS: 1 days | End: 2018-10-18
Payer: COMMERCIAL

## 2018-10-18 ENCOUNTER — APPOINTMENT (OUTPATIENT)
Dept: RADIOLOGY | Facility: CLINIC | Age: 68
End: 2018-10-18
Payer: COMMERCIAL

## 2018-10-18 ENCOUNTER — LABORATORY RESULT (OUTPATIENT)
Age: 68
End: 2018-10-18

## 2018-10-18 ENCOUNTER — APPOINTMENT (OUTPATIENT)
Dept: INTERNAL MEDICINE | Facility: CLINIC | Age: 68
End: 2018-10-18
Payer: COMMERCIAL

## 2018-10-18 VITALS
HEART RATE: 68 BPM | HEIGHT: 70 IN | WEIGHT: 194 LBS | BODY MASS INDEX: 27.77 KG/M2 | SYSTOLIC BLOOD PRESSURE: 142 MMHG | OXYGEN SATURATION: 98 % | TEMPERATURE: 98.4 F | DIASTOLIC BLOOD PRESSURE: 64 MMHG

## 2018-10-18 DIAGNOSIS — R50.9 FEVER, UNSPECIFIED: ICD-10-CM

## 2018-10-18 PROCEDURE — 71046 X-RAY EXAM CHEST 2 VIEWS: CPT | Mod: 26

## 2018-10-18 PROCEDURE — 71046 X-RAY EXAM CHEST 2 VIEWS: CPT

## 2018-10-18 PROCEDURE — 99213 OFFICE O/P EST LOW 20 MIN: CPT

## 2018-10-22 ENCOUNTER — RX RENEWAL (OUTPATIENT)
Age: 68
End: 2018-10-22

## 2018-10-22 LAB
ANION GAP SERPL CALC-SCNC: 15 MMOL/L
APPEARANCE: CLEAR
BACTERIA UR CULT: NORMAL
BACTERIA: NEGATIVE
BASOPHILS # BLD AUTO: 0.06 K/UL
BASOPHILS NFR BLD AUTO: 0.9 %
BILIRUBIN URINE: NEGATIVE
BLOOD URINE: NEGATIVE
BUN SERPL-MCNC: 12 MG/DL
CALCIUM SERPL-MCNC: 10.4 MG/DL
CHLORIDE SERPL-SCNC: 101 MMOL/L
CO2 SERPL-SCNC: 25 MMOL/L
COLOR: ABNORMAL
CREAT SERPL-MCNC: 0.76 MG/DL
EOSINOPHIL # BLD AUTO: 0.03 K/UL
EOSINOPHIL NFR BLD AUTO: 0.5 %
ERYTHROCYTE [SEDIMENTATION RATE] IN BLOOD BY WESTERGREN METHOD: 10 MM/HR
GLUCOSE QUALITATIVE U: NEGATIVE MG/DL
GLUCOSE SERPL-MCNC: 108 MG/DL
HCT VFR BLD CALC: 44.5 %
HGB BLD-MCNC: 14.7 G/DL
HYALINE CASTS: 1 /LPF
IMM GRANULOCYTES NFR BLD AUTO: 0.6 %
KETONES URINE: ABNORMAL
LEUKOCYTE ESTERASE URINE: NEGATIVE
LYMPHOCYTES # BLD AUTO: 2.17 K/UL
LYMPHOCYTES NFR BLD AUTO: 33.6 %
MAN DIFF?: NORMAL
MCHC RBC-ENTMCNC: 33 GM/DL
MCHC RBC-ENTMCNC: 33.3 PG
MCV RBC AUTO: 100.7 FL
MICROSCOPIC-UA: NORMAL
MONOCYTES # BLD AUTO: 1.06 K/UL
MONOCYTES NFR BLD AUTO: 16.4 %
NEUTROPHILS # BLD AUTO: 3.09 K/UL
NEUTROPHILS NFR BLD AUTO: 48 %
NITRITE URINE: NEGATIVE
PH URINE: 5.5
PLATELET # BLD AUTO: 290 K/UL
POTASSIUM SERPL-SCNC: 4.5 MMOL/L
PROTEIN URINE: NEGATIVE MG/DL
RBC # BLD: 4.42 M/UL
RBC # FLD: 15.5 %
RED BLOOD CELLS URINE: 3 /HPF
SODIUM SERPL-SCNC: 141 MMOL/L
SPECIFIC GRAVITY URINE: 1.03
SQUAMOUS EPITHELIAL CELLS: 0 /HPF
UROBILINOGEN URINE: NEGATIVE MG/DL
WBC # FLD AUTO: 6.45 K/UL
WHITE BLOOD CELLS URINE: 0 /HPF

## 2018-10-23 RX ORDER — AMITRIPTYLINE HYDROCHLORIDE 10 MG/1
10 TABLET, FILM COATED ORAL 3 TIMES DAILY
Qty: 90 | Refills: 5 | Status: DISCONTINUED | COMMUNITY
Start: 2018-09-27 | End: 2018-10-23

## 2018-10-23 RX ORDER — HYDROCODONE BITARTRATE AND HOMATROPINE METHYLBROMIDE 5; 1.5 MG/5ML; MG/5ML
5-1.5 SOLUTION ORAL
Qty: 800 | Refills: 0 | Status: DISCONTINUED | COMMUNITY
Start: 2018-10-22 | End: 2018-10-23

## 2018-10-23 RX ORDER — HYDROCODONE BITARTRATE AND HOMATROPINE METHYLBROMIDE 5; 1.5 MG/5ML; MG/5ML
5-1.5 SOLUTION ORAL
Qty: 800 | Refills: 0 | Status: DISCONTINUED | COMMUNITY
Start: 2018-05-22 | End: 2018-10-23

## 2018-10-23 RX ORDER — HYDROCODONE BITARTRATE AND HOMATROPINE METHYLBROMIDE 5; 1.5 MG/5ML; MG/5ML
5-1.5 SOLUTION ORAL
Qty: 800 | Refills: 0 | Status: DISCONTINUED | COMMUNITY
Start: 2018-09-11 | End: 2018-10-23

## 2018-10-23 RX ORDER — HYDROCODONE BITARTRATE AND HOMATROPINE METHYLBROMIDE 5; 1.5 MG/5ML; MG/5ML
5-1.5 SOLUTION ORAL
Qty: 1500 | Refills: 0 | Status: DISCONTINUED | COMMUNITY
Start: 2018-05-16 | End: 2018-10-23

## 2018-10-23 NOTE — HISTORY OF PRESENT ILLNESS
[FreeTextEntry8] : Chills, fevers, drenching through clothes every night for about 2 weeks. Highest temp was 100.0. No new URI symptoms. Chronic cough. No new meds. Mild back and lung pain. No diarrhea, no dysuria, no rashes, no new lumps, bumps. No leg swelling or pain, no recent travel. No new pets or animal exposures. No weight lose. No new meds. No recent procedures\par \par [facility] 10/16. Flu swab was negative. No additional tests.\par \par PMH\par CLL s/p splenectomy\par eosino bronchitis\par \par SH \par no travel\par wife not sick

## 2018-10-23 NOTE — PHYSICAL EXAM
[No Acute Distress] : no acute distress [Normal Oropharynx] : the oropharynx was normal [Normal TMs] : both tympanic membranes were normal [Supple] : supple [No Lymphadenopathy] : no lymphadenopathy [No Respiratory Distress] : no respiratory distress  [No Accessory Muscle Use] : no accessory muscle use [Normal Rate] : normal rate  [Regular Rhythm] : with a regular rhythm [Normal S1, S2] : normal S1 and S2 [No Edema] : there was no peripheral edema [Soft] : abdomen soft [Non Tender] : non-tender [No HSM] : no HSM [Normal Bowel Sounds] : normal bowel sounds [No CVA Tenderness] : no CVA  tenderness [No Spinal Tenderness] : no spinal tenderness [No Joint Swelling] : no joint swelling [No Rash] : no rash [Normal Gait] : normal gait [de-identified] : slightly decreased on L side

## 2018-10-23 NOTE — PLAN
[FreeTextEntry1] : 69 yo man with a PHX of CLL and eosinophilic bronchitis with FUO??\par \par Could be some viral syndrome\par Up hydration, temp log nightly\par Assess for other infections- CXR-pneum, Ucx-UTI\par Touch base with Heme as well\par If all tests are normal and fevers continue  FUO work-up

## 2018-10-30 ENCOUNTER — APPOINTMENT (OUTPATIENT)
Dept: INTERNAL MEDICINE | Facility: CLINIC | Age: 68
End: 2018-10-30
Payer: COMMERCIAL

## 2018-10-30 VITALS
HEART RATE: 63 BPM | HEIGHT: 70 IN | TEMPERATURE: 98.2 F | BODY MASS INDEX: 27.06 KG/M2 | SYSTOLIC BLOOD PRESSURE: 134 MMHG | WEIGHT: 189 LBS | DIASTOLIC BLOOD PRESSURE: 68 MMHG | OXYGEN SATURATION: 98 %

## 2018-10-30 DIAGNOSIS — N13.8 BENIGN PROSTATIC HYPERPLASIA WITH LOWER URINARY TRACT SYMPMS: ICD-10-CM

## 2018-10-30 DIAGNOSIS — Z87.09 PERSONAL HISTORY OF OTHER DISEASES OF THE RESPIRATORY SYSTEM: ICD-10-CM

## 2018-10-30 DIAGNOSIS — R50.9 FEVER, UNSPECIFIED: ICD-10-CM

## 2018-10-30 DIAGNOSIS — N40.1 BENIGN PROSTATIC HYPERPLASIA WITH LOWER URINARY TRACT SYMPMS: ICD-10-CM

## 2018-10-30 PROCEDURE — 99397 PER PM REEVAL EST PAT 65+ YR: CPT

## 2018-10-30 RX ORDER — VALACYCLOVIR 1 G/1
1 TABLET, FILM COATED ORAL
Qty: 10 | Refills: 3 | Status: ACTIVE | COMMUNITY
Start: 2018-10-30 | End: 1900-01-01

## 2018-10-30 NOTE — HISTORY OF PRESENT ILLNESS
[FreeTextEntry1] : Presents for CPE. Feels better today. Had seen my colleague Dr. Bassett for URI 2 weeks ago. Due for annual physical today. Also needs flu and Pneumovax vaccines. Sees Urology for BPH. Saw GI for colonoscopy last year, was told ok, come back in 5 years.

## 2018-10-30 NOTE — PHYSICAL EXAM
[Prostate Tenderness] : the prostate was not tender [Prostate Nodule] : did not have a nodule [Prostate Enlarged] : was enlarged [Prostate Tenderness] : was not tender [Prostate Fluctuant] : was not fluctuant [General Appearance - Alert] : alert [General Appearance - In No Acute Distress] : in no acute distress [Sclera] : the sclera and conjunctiva were normal [PERRL With Normal Accommodation] : pupils were equal in size, round, and reactive to light [Extraocular Movements] : extraocular movements were intact [Outer Ear] : the ears and nose were normal in appearance [Oropharynx] : the oropharynx was normal [Neck Appearance] : the appearance of the neck was normal [Neck Cervical Mass (___cm)] : no neck mass was observed [Jugular Venous Distention Increased] : there was no jugular-venous distention [Thyroid Diffuse Enlargement] : the thyroid was not enlarged [Thyroid Nodule] : there were no palpable thyroid nodules [Auscultation Breath Sounds / Voice Sounds] : lungs were clear to auscultation bilaterally [Heart Rate And Rhythm] : heart rate was normal and rhythm regular [Heart Sounds] : normal S1 and S2 [Heart Sounds Gallop] : no gallops [Murmurs] : no murmurs [Heart Sounds Pericardial Friction Rub] : no pericardial rub [Full Pulse] : the pedal pulses are present [Edema] : there was no peripheral edema [Bowel Sounds] : normal bowel sounds [Abdomen Soft] : soft [Abdomen Tenderness] : non-tender [Abdomen Mass (___ Cm)] : no abdominal mass palpated [Cervical Lymph Nodes Enlarged Posterior Bilaterally] : posterior cervical [Cervical Lymph Nodes Enlarged Anterior Bilaterally] : anterior cervical [Supraclavicular Lymph Nodes Enlarged Bilaterally] : supraclavicular [Axillary Lymph Nodes Enlarged Bilaterally] : axillary [Femoral Lymph Nodes Enlarged Bilaterally] : femoral [Inguinal Lymph Nodes Enlarged Bilaterally] : inguinal [No CVA Tenderness] : no ~M costovertebral angle tenderness [No Spinal Tenderness] : no spinal tenderness [Abnormal Walk] : normal gait [Nail Clubbing] : no clubbing  or cyanosis of the fingernails [Musculoskeletal - Swelling] : no joint swelling seen [Motor Tone] : muscle strength and tone were normal [Skin Color & Pigmentation] : normal skin color and pigmentation [Skin Turgor] : normal skin turgor [] : no rash [Sensation] : the sensory exam was normal to light touch and pinprick [Motor Exam] : the motor exam was normal [No Focal Deficits] : no focal deficits [Oriented To Time, Place, And Person] : oriented to person, place, and time [Impaired Insight] : insight and judgment were intact [Affect] : the affect was normal

## 2018-10-30 NOTE — ASSESSMENT
[FreeTextEntry1] : \par 68 year old man with rhinitis, CLL, BPH presents for CPE.\par \par 1. CLL: cont f/u with Heme Dr. Martin\par \par 2. BPH: cont f/u with ; check PSA with labs\par \par 3. Rhinitis: cont Azelastine\par \par 4. HCM: pt saw GI for colonoscopy last year, was told ok; needs flu and Pneumovax but will get at pharmacy next week since he is just getting over URI; check PSA with labs

## 2018-11-01 ENCOUNTER — TRANSCRIPTION ENCOUNTER (OUTPATIENT)
Age: 68
End: 2018-11-01

## 2018-11-01 LAB
CHOLEST SERPL-MCNC: 171 MG/DL
CHOLEST/HDLC SERPL: 5.2 RATIO
HBA1C MFR BLD HPLC: 5.7 %
HDLC SERPL-MCNC: 33 MG/DL
LDLC SERPL CALC-MCNC: 109 MG/DL
PSA SERPL-MCNC: 4.01 NG/ML
TRIGL SERPL-MCNC: 144 MG/DL

## 2018-12-11 ENCOUNTER — RX RENEWAL (OUTPATIENT)
Age: 68
End: 2018-12-11

## 2018-12-11 RX ORDER — HYDROCODONE BITARTRATE AND HOMATROPINE METHYLBROMIDE 5; 1.5 MG/5ML; MG/5ML
5-1.5 SYRUP ORAL
Qty: 1500 | Refills: 0 | Status: ACTIVE | COMMUNITY
Start: 2018-12-11 | End: 1900-01-01

## 2019-01-31 ENCOUNTER — TRANSCRIPTION ENCOUNTER (OUTPATIENT)
Age: 69
End: 2019-01-31

## 2019-01-31 ENCOUNTER — RX RENEWAL (OUTPATIENT)
Age: 69
End: 2019-01-31

## 2019-02-01 ENCOUNTER — TRANSCRIPTION ENCOUNTER (OUTPATIENT)
Age: 69
End: 2019-02-01

## 2019-02-21 ENCOUNTER — FORM ENCOUNTER (OUTPATIENT)
Age: 69
End: 2019-02-21

## 2019-02-22 ENCOUNTER — OUTPATIENT (OUTPATIENT)
Dept: OUTPATIENT SERVICES | Facility: HOSPITAL | Age: 69
LOS: 1 days | End: 2019-02-22
Payer: SUBSIDIZED

## 2019-02-22 ENCOUNTER — APPOINTMENT (OUTPATIENT)
Dept: ORTHOPEDIC SURGERY | Facility: CLINIC | Age: 69
End: 2019-02-22
Payer: COMMERCIAL

## 2019-02-22 VITALS
HEIGHT: 70 IN | OXYGEN SATURATION: 99 % | HEART RATE: 60 BPM | DIASTOLIC BLOOD PRESSURE: 76 MMHG | SYSTOLIC BLOOD PRESSURE: 118 MMHG | BODY MASS INDEX: 27.2 KG/M2 | WEIGHT: 190 LBS

## 2019-02-22 DIAGNOSIS — M17.12 UNILATERAL PRIMARY OSTEOARTHRITIS, LEFT KNEE: ICD-10-CM

## 2019-02-22 PROCEDURE — ZZZZZ: CPT

## 2019-02-22 PROCEDURE — 73562 X-RAY EXAM OF KNEE 3: CPT | Mod: 26,50

## 2019-02-22 PROCEDURE — 73560 X-RAY EXAM OF KNEE 1 OR 2: CPT | Mod: 26,50

## 2019-02-22 PROCEDURE — 73660 X-RAY EXAM OF TOE(S): CPT

## 2019-02-22 PROCEDURE — 73560 X-RAY EXAM OF KNEE 1 OR 2: CPT

## 2019-02-22 PROCEDURE — 73564 X-RAY EXAM KNEE 4 OR MORE: CPT

## 2019-02-22 PROCEDURE — 73562 X-RAY EXAM OF KNEE 3: CPT

## 2019-02-22 PROCEDURE — 99214 OFFICE O/P EST MOD 30 MIN: CPT

## 2019-02-22 PROCEDURE — 73565 X-RAY EXAM OF KNEES: CPT

## 2019-03-27 ENCOUNTER — APPOINTMENT (OUTPATIENT)
Dept: PULMONOLOGY | Facility: CLINIC | Age: 69
End: 2019-03-27
Payer: COMMERCIAL

## 2019-03-27 ENCOUNTER — NON-APPOINTMENT (OUTPATIENT)
Age: 69
End: 2019-03-27

## 2019-03-27 ENCOUNTER — TRANSCRIPTION ENCOUNTER (OUTPATIENT)
Age: 69
End: 2019-03-27

## 2019-03-27 VITALS
HEART RATE: 65 BPM | DIASTOLIC BLOOD PRESSURE: 70 MMHG | SYSTOLIC BLOOD PRESSURE: 110 MMHG | HEIGHT: 70 IN | RESPIRATION RATE: 14 BRPM | OXYGEN SATURATION: 96 % | BODY MASS INDEX: 27.49 KG/M2 | WEIGHT: 192 LBS

## 2019-03-27 PROCEDURE — 99214 OFFICE O/P EST MOD 30 MIN: CPT | Mod: 25

## 2019-03-27 PROCEDURE — 94010 BREATHING CAPACITY TEST: CPT

## 2019-03-27 RX ORDER — FLUTICASONE PROPIONATE 50 UG/1
50 SPRAY, METERED NASAL TWICE DAILY
Qty: 3 | Refills: 1 | Status: ACTIVE | COMMUNITY
Start: 2019-03-27 | End: 1900-01-01

## 2019-03-27 NOTE — ASSESSMENT
[FreeTextEntry1] : Mr. Acosta is a 68 year old male with a history of RADs / allergy sinus / CLL / GERD  who presents to the office for a follow up visit. \par \par His shortness of breath (quiet) is multifactorial due to:\par -post nasal drip syndrome\par -RADS\par -eosinophilic bronchitis\par -GERD\par -CLL: f/u with Dr. Martin and Dr. Trev Gastelum\par \par problem 1: post nasal drip\par -continue to use saline\par -Continue Olopatadine 0.6% 1 sniff BID \par - Continue Flonase 1 sniff/nostril BID \par -continue Clarinex 5 mg before bed\par -recommended to use "Navage" nasal rinse device \par -continue Palgic 4 mg up to TID \par \par -Environmental measures for allergies were encouraged including mattress and pillow cover, air purifier, and environmental controls.\par \par problem 2: RADS\par -continue to use Ventolin PRN and before exercise\par \par -Inhaler technique reviewed as well as oral hygiene techniques reviewed with patient. Avoidance of cold air, extremes of temperature, rescue inhaler should be used before exercise. Order of medication reviewed with patient. Recommended use of a cool mist humidifier in the bedroom. 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 It sometimes develops following a URI, herpes zoster outbreak in pharynx or thyroid or cervical spine injury. However, many patients have no identifiable antecedent explanation. \par Any cough greater than three weeks duration-differential diagnosis includes-asthma, upper airway cough syndrome, post nasal drip syndrome, gastroesophageal reflux, laryngopharyngeal reflux, cardiac disease (congestive heart failure, medicines, effects, etc), medication effects (b-blockers, ace inhibitors, ARBs, glaucoma meds, etc.), smoking, infectious, multifactorial, etc \par \par problem 4: eosinophilic bronchitis (Resolved)/ Eosinophilia 10/2018\par -being recommended to use Ventolin PRN and before exercise/Symbicort\par -continue Symbicort 160 at 2 inhalations BID prn\par -continue QVar 80 at 2 inhalations BID\par \par problem 5: GERD\par -continue to use Zantac 300 mg QHS\par \par -Rule of 2s: avoid eating too much, eating too late, eating too spicy, eating two hours before bed\par -Things to avoid including overeating, spicy foods, tight clothing, eating within three hours of bed, this list is not all inclusive. \par -For treatment of reflux, possible options discussed including diet control, H2 blockers, PPIs, as well as coating motility agents discussed as treatment options. Timing of meals and proximity of last meal to sleep were discussed. If symptoms persist, a formal gastrointestinal evaluation is needed.\par \par problem 6: low vitamin D\par -continue to use supplemental vitamin D supplementation\par \par -Has been associated with asthma exacerbations and increased allergic symptoms. The goal based on recent information is maintaining levels between 50-70 and low normal is 30. Recommended 50,000 units every two weeks to once a month depending on the level. \par \par problem 7: OSAS\par -sleep apnea revealed moderate sleep apnea\par - Using oral appliance \par - S/p referral to Dr. Sivakumar Casarez and Dr. Okeefe\par \par -Treatment options discussed including CPAP/BiPAP machine, oral appliance, ProVent therapy, Oxy-Aid by Respitec, new technologies, or positional sleep.Recommended use of the CPAP machine for moderate (AHI >15), moderate to severe (AHI 15-30) and severe patients (AHI > 30). Recommended weight loss which can reduce AHI especially in weight loss of greater than 5% of BMI. Positional sleep is recommended in those with low AHI, low-moderate BMI, and younger age. For severe sleep apnea, the hypoglossal nerve stimulator was recommended as well. \par -Sleep apnea is associated with adverse clinical consequences which an affect most organ systems.  Cardiovascular disease risk includes arrhythmias, atrial fibrillation, hypertension, coronary artery disease, and stroke. Metabolic disorders include diabetes type 2, non-alcoholic fatty liver disease. Mood disorder especially depression; and cognitive decline especially in the elderly. Associations with  chronic reflux/Kyle’s esophagus some but not all inclusive. \par -Reasons to assess this include arousal consistent with hypopnea; respiratory events most prominent in REM sleep or supine position; therefore sleep staging and body position are important for accurate diagnosis and estimation of AHI. \par \par problem 8: overweight\par -Weight loss, exercise, and diet control were discussed and are highly encouraged. Treatment options were given such as, aqua therapy, and contacting a nutritionist. Recommended to use the elliptical, stationary bike, less use of treadmill.  Obesity is associated with worsening asthma, shortness of breath, and potential for cardiac disease, diabetes, and other underlying medical conditions. \par \par problem 9: health maintenance \par - Received an influenza vaccine 2018\par - Received Prevnar-13 vaccine 2017\par - Received Pneumo vaccine 23 2018\par -recommended early intervention for URIs\par -recommended regular osteoporosis evaluations\par -recommended early dermatological evaluations\par -recommended after the age of 50 to the age of 70, colonoscopy every 5 years \par \par F/U in 6 months\par He is encouraged to call with any changes, concerns, or questions.

## 2019-03-27 NOTE — ADDENDUM
[FreeTextEntry1] : Documented by Quirino Estrada acting as a scribe for Dr. Jose Martin Angulo on 3/27/2019\par \par All medical record entries made by the Scribe were at my, Dr. Jose Martin Angulo's, direction and personally dictated by me on 3/27/2019. I have reviewed the chart and agree that the record accurately reflects my personal performance of the history, physical exam, assessment and plan. I have also personally directed, reviewed, and agree with the discharge instructions. \par \par \par \par \par

## 2019-03-27 NOTE — REASON FOR VISIT
[Follow-Up] : a follow-up visit [FreeTextEntry1] : allergic rhinitis, cough, asthma, eosinophilia, GERD, low vitamin D/testosterone, non allergic rhinitis, PND, sinus congestion, sleep apnea

## 2019-03-27 NOTE — PROCEDURE
[FreeTextEntry1] : PFT- spi reveals normal flows; FEV1 was 3.25L which is 96% of predicted; normal flow volume loop

## 2019-03-27 NOTE — HISTORY OF PRESENT ILLNESS
[FreeTextEntry1] : Mr. Acosta is a 68 year old male with a history of allergic rhinitis, cough, asthma, eosinophilia, GERD, low vitamin D/testosterone, non allergic rhinitis, PND, sinus congestion, sleep apnea presenting to the office today for a follow up visit. Her chief complaint is chronic cough. \par - He comes in stating that this winter was not as aggravating as in the past\par - He continues to get chest pressure although he exercises regularly. \par - He notes that he does not get chest pressure while exercising, but he develops chest pressure following exercise.\par - He got a number of colds over the winter. \par - He has lost some weight by modifying the time at which he eats. He now forces himself to eat breakfast everyday and skips lunch.\par - his sinuses continues to be an issue\par - He states that sleeping is fine, and he is using his oral appliance.  \par - He notes that by the end of the day he is coughing and needs to take his cough medication\par - His cough is precipitated by a tickle in his chest. \par - His bowels are regular\par - He denies any headaches, nausea, vomiting, fever, chills, sweats, chest pain, palpitations, SOB, wheezing, fatigue, diarrhea, constipation, dysphagia, myalgias, dizziness, leg swelling, leg pain, itchy eyes, itchy ears, heartburn, reflux, or sour taste in the mouth.

## 2019-04-26 PROBLEM — M17.12 ARTHRITIS OF KNEE, LEFT: Status: ACTIVE | Noted: 2019-04-26

## 2019-04-26 NOTE — PHYSICAL EXAM
[de-identified] : General: Not in acute distress, dressed appropriately, sitting on examination table\par Skin: Warm and dry, normal turgor, no rashes\par Neurological: AOx3, Cranial nerves grossly in tact\par       Psych: Mood and affect appropriate\par \par Focused exam left Knee: No swelling edema erythema redness or drainage. Neutral Alignment. Tender medially. ROM: 0-125. Stable. . 5/5 strength. Ambulates with no devices. [de-identified] : X-rays of the left knee show moderately decreased joint space in the medial and lateral compartments, less so in the patellofemoral compartment, there are peripheral osteophytes noted, and also chondrocalcinosis.

## 2019-04-26 NOTE — ASSESSMENT
[FreeTextEntry1] : Assessment\par #1 left knee arthritis\par \par Plan\par #1 followup per [facility] protocols\par #2 we'll send for lab work per the same protocols.

## 2019-06-15 ENCOUNTER — RX RENEWAL (OUTPATIENT)
Age: 69
End: 2019-06-15

## 2019-07-09 ENCOUNTER — APPOINTMENT (OUTPATIENT)
Dept: ORTHOPEDIC SURGERY | Facility: CLINIC | Age: 69
End: 2019-07-09
Payer: COMMERCIAL

## 2019-07-09 DIAGNOSIS — M75.110 INCOMPLETE ROTATOR CUFF TEAR OR RUPTURE OF UNSPECIFIED SHOULDER, NOT SPECIFIED AS TRAUMATIC: ICD-10-CM

## 2019-07-09 PROCEDURE — 73030 X-RAY EXAM OF SHOULDER: CPT | Mod: LT

## 2019-07-09 PROCEDURE — 99214 OFFICE O/P EST MOD 30 MIN: CPT

## 2019-07-12 NOTE — CONSULT LETTER
[Dear  ___] : Dear  [unfilled], [FreeTextEntry1] : I had the pleasure of evaluating your patient in the office today for complaints of left shoulder pain secondary to bursitis . I have enclosed a copy of today's office notes for your charts and for your review.\par \fabby Sincerely, \par \par Angel Palmer M.D.\par Professor and \par Department of Orthopedic Surgery\par [facility]"

## 2019-07-12 NOTE — PHYSICAL EXAM
[de-identified] : Pt is pleasant 70yo male, AAOx3 with no apparent distress, normal BMI.  Physical examination of eft shoulders reveals normal contours with no deformity, skin intact, with no signs of infection, no erythema, no swelling, no discoloration, no distal lymphedema, no patholaxity, no muscle atrophy noted. ROM of left shoulder reveals forward flexion to 160°,  external rotation 65°,  IR to T10. Motor strength 5/5 in ER, IR, and FF in the scapular plane which is mildly painful with testing but no focal weakness appreciated. Negative speeds test, negative Roa's Negative Impingement/gómez. No neurological deficits noted. \par \par  \par  [de-identified] : X-rays were ordered, obtained, and interpreted by me today: 4 views of the left shoulder demonstrate type 2/3 acromion morphology, with no acute fracture or dislocation. Detail Level: Zone

## 2019-07-12 NOTE — HISTORY OF PRESENT ILLNESS
[de-identified] : 68 y/o RHD male who is in  with history of left shoulder arthroscopic RTCR on 2/12/2015 presents with right shoulder pain for a year. He denies any injury or trauma. He is an avid exerciser and notice pain with heavy weights, so he started to reduce his weights. \par \par He states the pain is 2/10 at rest and increases with certain motions. He does not take any pain meds. The pain does not keep him up at night. He denies any numbness or tingling.

## 2019-07-12 NOTE — DISCUSSION/SUMMARY
[de-identified] : 70 y/o male with left shoulder pain secondary to possible small rotator cuff tear versus Subacromial bursitis. Patient states he is active and at the gym began to have pain with shoulder exercises which has limited his ability to perform shoulder press. A lengthy discussion was held regarding the patient’s condition and treatment options including all risks, benefits, prognosis and outcomes of each were discussed in detail. The patient would like to continue with conservative management at this time and was advised on the use of NSAIDS for pain control, icing, activity modification, and possible cortisone injections. Advised patient to perform home PT and if having continued pain, will recommend MRI of left shoulder at next visit. The patient will contact me if there are any concerns otherwise follow up will be on a prn basis. The patient express understanding and all questions were answered.

## 2019-08-07 ENCOUNTER — APPOINTMENT (OUTPATIENT)
Dept: ORTHOPEDIC SURGERY | Facility: CLINIC | Age: 69
End: 2019-08-07
Payer: COMMERCIAL

## 2019-08-07 PROCEDURE — 20610 DRAIN/INJ JOINT/BURSA W/O US: CPT | Mod: RT

## 2019-08-07 NOTE — DISCUSSION/SUMMARY
[de-identified] : 70 y/o male with right knee OA. A lengthy discussion was held regarding the patient’s condition and treatment options including all risks, benefits, prognosis and outcomes of each were discussed in detail. The patient would like to continue with conservative treatment. The pt was given the first HA injection, which she tolerated without any complication. The patient is advised to modify activities, and is instructed to  ice the knee as well.  After 48 hours the patient has been advised to proceed towards low impact exercising most specifically stationary bicycling using low resistance for up to 20 minutes a session once a day. The patient will follow up within one to two weeks for the second injection. The pt express understanding and all questions were answered.

## 2019-08-07 NOTE — HISTORY OF PRESENT ILLNESS
[de-identified] : 70 y/o male with history of partial ACL tear 20 years ago and history of right knee arthroscopic meniscectomy around 1995 by Keshawn Rios presents with continued right knee pain. He was seen by Dr. Palmer and has right knee OA.  He denies any recent injury or trauma. He is here today to start HA injections. He had injections of synvisc about 5 years ago which did help. He states the pain is 2/10 at this visit, which he rarely takes Tylenol or Motrin. He denies any numbness or tingling.

## 2019-08-07 NOTE — PROCEDURE
[de-identified] : After careful discussion with the patient regarding the risks versus benefits of a hyaluronic acid injection, the patient expressed a wish to proceed ahead with that treatment. Under sterile conditions the first in a series of 3 hyaluronic acid injections has been given into the medial compartment of the right knee without complication. The patient has been advised to modify activities over the course of the first few days after the injection along with diligent icing to reduce any inflammation. After 48 hours the patient has been advised to proceed towards low impact exercising most specifically stationary bicycling using low resistance for up to 20 minutes a session once a day. \par

## 2019-08-07 NOTE — PHYSICAL EXAM
[LE] : Sensory: Intact in bilateral lower extremities [Knee] : patellar 2+ and symmetric bilaterally [PT] : posterior tibial 2+ and symmetric bilaterally [de-identified] : 70 y/o pleasant male in NAD and AAOx3. Normal BMI. The pt has a mildly antalgic gait. Physical examination of both knees reveals normal contours, skin intact, healed previous incisions, with no signs of infection, no erythema, no swelling, no effusion, no distal lymphedema or phlebitis, no patholaxity. ROM of the right knee reveals 0°-120° Strength is 5/5 within this arc of motion. There is no pain on palpation to the medial or lateral joint line. 1+ anterior drawer/Lachman, minimal pivot. Patient noted to have genu varum. There are no neurological deficits

## 2019-08-07 NOTE — CONSULT LETTER
[Dear  ___] : Dear  [unfilled], [FreeTextEntry1] : I had the pleasure of evaluating your patient in the office today for complaints of right knee pain secondary to osteoarthritis. I have enclosed a copy of today's office notes for your charts and for your review.\par \par Sincerely, \par \par Giuseppe Condon\par Sr. Physician Assistant \par Department of Orthopedic Surgery\par [facility] [place]

## 2019-08-14 ENCOUNTER — APPOINTMENT (OUTPATIENT)
Dept: ORTHOPEDIC SURGERY | Facility: CLINIC | Age: 69
End: 2019-08-14
Payer: COMMERCIAL

## 2019-08-14 PROCEDURE — 20610 DRAIN/INJ JOINT/BURSA W/O US: CPT | Mod: RT

## 2019-08-14 NOTE — CONSULT LETTER
[Dear  ___] : Dear  [unfilled], [FreeTextEntry1] : I had the pleasure of evaluating your patient in the office today for complaints of right knee pain secondary to osteoarthritis. I have enclosed a copy of today's office notes for your charts and for your review.\par \par Sincerely, \par \par Giuseppe Condon\par Sr. Physician Assistant \par Department of Orthopedic Surgery\par [facility] [place]

## 2019-08-14 NOTE — PROCEDURE
[de-identified] : After careful discussion with the patient regarding the risks versus benefits of a hyaluronic acid injection, the patient expressed a wish to proceed ahead with that treatment. Under sterile conditions the second in a series of 3 hyaluronic acid injections has been given into the medial compartment of the right knee without complication. The patient has been advised to modify activities over the course of the first few days after the injection along with diligent icing to reduce any inflammation. After 48 hours the patient has been advised to proceed towards low impact exercising most specifically stationary bicycling using low resistance for up to 20 minutes a session once a day. \par \par Synvisc\par Lot #: [id number]\par Exp Date: 10/2021

## 2019-08-21 ENCOUNTER — APPOINTMENT (OUTPATIENT)
Dept: ORTHOPEDIC SURGERY | Facility: CLINIC | Age: 69
End: 2019-08-21
Payer: COMMERCIAL

## 2019-08-21 DIAGNOSIS — M17.11 UNILATERAL PRIMARY OSTEOARTHRITIS, RIGHT KNEE: ICD-10-CM

## 2019-08-21 PROCEDURE — 20610 DRAIN/INJ JOINT/BURSA W/O US: CPT | Mod: RT

## 2019-08-21 NOTE — PROCEDURE
[de-identified] : After careful discussion with the patient regarding the risks versus benefits of a hyaluronic acid injection, the patient expressed a wish to proceed ahead with that treatment. Under sterile conditions the third in a series of 3 hyaluronic acid injections has been given into the medial compartment of the right knee without complication. The patient has been advised to modify activities over the course of the first few days after the injection along with diligent icing to reduce any inflammation. After 48 hours the patient has been advised to proceed towards low impact exercising most specifically stationary bicycling using low resistance for up to 20 minutes a session once a day. \par \par Synvisc\par Lot #: [id number]\par Exp Date: 10/2021

## 2019-08-21 NOTE — CONSULT LETTER
[Dear  ___] : Dear  [unfilled], [FreeTextEntry1] : I had the pleasure of evaluating your patient in the office today for complaints of right knee pain secondary to osteoarthritis. I have enclosed a copy of today's office notes for your charts and for your review.\par \par Sincerely, \par \par Giuseppe Condon\par Sr. Physician Assistant \par Department of Orthopedic Surgery\par [facility] [place]

## 2019-09-05 ENCOUNTER — RX RENEWAL (OUTPATIENT)
Age: 69
End: 2019-09-05

## 2019-09-05 ENCOUNTER — MEDICATION RENEWAL (OUTPATIENT)
Age: 69
End: 2019-09-05

## 2019-09-18 ENCOUNTER — NON-APPOINTMENT (OUTPATIENT)
Age: 69
End: 2019-09-18

## 2019-09-18 ENCOUNTER — APPOINTMENT (OUTPATIENT)
Dept: PULMONOLOGY | Facility: CLINIC | Age: 69
End: 2019-09-18
Payer: COMMERCIAL

## 2019-09-18 VITALS
BODY MASS INDEX: 29.56 KG/M2 | SYSTOLIC BLOOD PRESSURE: 130 MMHG | WEIGHT: 199.6 LBS | DIASTOLIC BLOOD PRESSURE: 80 MMHG | OXYGEN SATURATION: 96 % | HEART RATE: 61 BPM | RESPIRATION RATE: 17 BRPM | HEIGHT: 69 IN

## 2019-09-18 DIAGNOSIS — C91.90 LYMPHOID LEUKEMIA, UNSPECIFIED NOT HAVING ACHIEVED REMISSION: ICD-10-CM

## 2019-09-18 DIAGNOSIS — R79.89 OTHER SPECIFIED ABNORMAL FINDINGS OF BLOOD CHEMISTRY: ICD-10-CM

## 2019-09-18 PROCEDURE — 99214 OFFICE O/P EST MOD 30 MIN: CPT | Mod: 25

## 2019-09-18 PROCEDURE — 94010 BREATHING CAPACITY TEST: CPT

## 2019-09-18 PROCEDURE — 95012 NITRIC OXIDE EXP GAS DETER: CPT

## 2019-09-18 NOTE — HISTORY OF PRESENT ILLNESS
[FreeTextEntry1] : Mr. Acosta is a 68 year old male with a history of allergic rhinitis, cough, asthma, eosinophilia, GERD, low vitamin D/testosterone, non allergic rhinitis, PND, sinus congestion, sleep apnea presenting to the office today for a follow up visit. Her chief complaint is cough.\par -he reports having nasal congestion, and had several bouts of congestion throughout the summer\par -he reports he exercises regularly by going to the gym. He notes when he exercises, his sinuses open and drain into his chest.  He adds this occurred yesterday.\par -he reports having itchy eyes occasionally\par -he reports having a sour taste in his mouth from his cough medicine\par -he notes his energy level is good\par -he reports becoming SOB occasionally on exertion\par -he notes he is using his dental device regularly and doesn’t use \par -he denies any palpitations, chest pain, chest pressure, diarrhea, constipation, dysphagia, dizziness, sour taste in the mouth, itchy ears, heartburn, reflux

## 2019-09-18 NOTE — PROCEDURE
[FreeTextEntry1] : PFT revealed normal flows, with a FEV1 of 3.35L, which is 100% of predicted, with a normal flow volume loop \par \par FENO was 39; a normal value being less than 25\par Fractional exhaled nitric oxide (FENO) is regarded as a simple, noninvasive method for assessing eosinophilic airway inflammation. Produced by a variety of cells within the lung, nitric oxide (NO) concentrations are generally low in healthy individuals. However, high concentrations of NO appear to be involved in nonspecific host defense mechanisms and chronic inflammatory diseases such as asthma. The American Thoracic Society (ATS) therefore has recommended using FENO to aid in the diagnosis and monitoring of eosinophilic airway inflammation and asthma, and for identifying steroid responsive individuals whose chronic respiratory symptoms may be caused by airway inflammation.

## 2019-09-18 NOTE — ASSESSMENT
[FreeTextEntry1] : Mr. Acosta is a 69 year old male with a history of RADs / allergy sinus / CLL / GERD  who presents to the office for a follow up visit. \par \par His shortness of breath (quiet) is multifactorial due to:\par -post nasal drip syndrome\par -RADS\par -eosinophilic bronchitis\par -GERD\par -CLL: f/u with Dr. Martin and Dr. Trev Gastelum\par \par problem 1: post nasal drip\par -continue to use saline\par -Continue Olopatadine 0.6% 1 sniff BID \par - Continue Flonase 1 sniff/nostril BID \par -continue Clarinex 5 mg before bed\par -recommended to use "Navage" nasal rinse device \par -continue Palgic 4 mg up to TID \par \par -Environmental measures for allergies were encouraged including mattress and pillow cover, air purifier, and environmental controls.\par \par problem 2: RADS\par -continue to use Ventolin PRN and before exercise\par \par -Inhaler technique reviewed as well as oral hygiene techniques reviewed with patient. Avoidance of cold air, extremes of temperature, rescue inhaler should be used before exercise. Order of medication reviewed with patient. Recommended use of a cool mist humidifier in the bedroom. 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 It sometimes develops following a URI, herpes zoster outbreak in pharynx or thyroid or cervical spine injury. However, many patients have no identifiable antecedent explanation. \par Any cough greater than three weeks duration-differential diagnosis includes-asthma, upper airway cough syndrome, post nasal drip syndrome, gastroesophageal reflux, laryngopharyngeal reflux, cardiac disease (congestive heart failure, medicines, effects, etc), medication effects (b-blockers, ace inhibitors, ARBs, glaucoma meds, etc.), smoking, infectious, multifactorial, etc \par \par problem 4: eosinophilic bronchitis (Resolved)/ Eosinophilia 10/2018\par -being recommended to use Ventolin PRN and before exercise/Symbicort\par -continue Symbicort 160 at 2 inhalations BID prn\par -continue QVar 80 at 2 inhalations BID\par \par problem 5: GERD\par -continue to use Zantac 300 mg QHS\par \par -Rule of 2s: avoid eating too much, eating too late, eating too spicy, eating two hours before bed\par -Things to avoid including overeating, spicy foods, tight clothing, eating within three hours of bed, this list is not all inclusive. \par -For treatment of reflux, possible options discussed including diet control, H2 blockers, PPIs, as well as coating motility agents discussed as treatment options. Timing of meals and proximity of last meal to sleep were discussed. If symptoms persist, a formal gastrointestinal evaluation is needed.\par \par problem 6: low vitamin D\par -continue to use supplemental vitamin D supplementation\par \par -Has been associated with asthma exacerbations and increased allergic symptoms. The goal based on recent information is maintaining levels between 50-70 and low normal is 30. Recommended 50,000 units every two weeks to once a month depending on the level. \par \par problem 7: OSAS\par -sleep apnea revealed moderate sleep apnea\par - Using oral appliance \par - S/p referral to Dr. Sivakumar Casarez and Dr. Okeefe\par \par -Treatment options discussed including CPAP/BiPAP machine, oral appliance, ProVent therapy, Oxy-Aid by Respitec, new technologies, or positional sleep.Recommended use of the CPAP machine for moderate (AHI >15), moderate to severe (AHI 15-30) and severe patients (AHI > 30). Recommended weight loss which can reduce AHI especially in weight loss of greater than 5% of BMI. Positional sleep is recommended in those with low AHI, low-moderate BMI, and younger age. For severe sleep apnea, the hypoglossal nerve stimulator was recommended as well. \par -Sleep apnea is associated with adverse clinical consequences which an affect most organ systems.  Cardiovascular disease risk includes arrhythmias, atrial fibrillation, hypertension, coronary artery disease, and stroke. Metabolic disorders include diabetes type 2, non-alcoholic fatty liver disease. Mood disorder especially depression; and cognitive decline especially in the elderly. Associations with  chronic reflux/Kyle’s esophagus some but not all inclusive. \par -Reasons to assess this include arousal consistent with hypopnea; respiratory events most prominent in REM sleep or supine position; therefore sleep staging and body position are important for accurate diagnosis and estimation of AHI. \par \par problem 8: overweight\par -Weight loss, exercise, and diet control were discussed and are highly encouraged. Treatment options were given such as, aqua therapy, and contacting a nutritionist. Recommended to use the elliptical, stationary bike, less use of treadmill.  Obesity is associated with worsening asthma, shortness of breath, and potential for cardiac disease, diabetes, and other underlying medical conditions. \par \par problem 9: health maintenance \par - Received an influenza vaccine 2018\par - Received Prevnar-13 vaccine 2017\par - Received Pneumo vaccine 23 2018\par -recommended early intervention for URIs\par -recommended regular osteoporosis evaluations\par -recommended early dermatological evaluations\par -recommended after the age of 50 to the age of 70, colonoscopy every 5 years \par \par F/U in 6 months with SPI and NiOx\par He is encouraged to call with any changes, concerns, or questions.

## 2019-09-18 NOTE — ADDENDUM
[FreeTextEntry1] : Documented by Roni Chow acting as a scribe for Dr. Jose Martin Angulo on 09/18/2019.\par \par All medical record entries made by the Scribe were at my, Dr. Jose Martin Angulo's, direction and personally dictated by me on 09/18/2019. I have reviewed the chart and agree that the record accurately reflects my personal performance of the history, physical exam, assessment and plan. I have also personally directed, reviewed, and agree with the discharge instructions. \par \par \par

## 2019-09-18 NOTE — REVIEW OF SYSTEMS
[Negative] : Sleep Disorder [Nasal Congestion] : nasal congestion [Sinus Problems] : sinus problems [Postnasal Drip] : postnasal drip [Cough] : cough [Dyspnea] : dyspnea [Itchy Eyes] : itching of ~T the eyes [As Noted in HPI] : as noted in HPI [Chest Discomfort] : no chest discomfort [Heartburn] : no heartburn [Reflux] : no reflux [Constipation] : no constipation [Dysphagia] : no dysphagia [Diarrhea] : no diarrhea

## 2019-09-18 NOTE — PHYSICAL EXAM
[General Appearance - Well Developed] : well developed [Well Groomed] : well groomed [Normal Appearance] : normal appearance [General Appearance - Well Nourished] : well nourished [No Deformities] : no deformities [General Appearance - In No Acute Distress] : no acute distress [Normal Conjunctiva] : the conjunctiva exhibited no abnormalities [Eyelids - No Xanthelasma] : the eyelids demonstrated no xanthelasmas [Normal Oropharynx] : normal oropharynx [III] : III [Neck Appearance] : the appearance of the neck was normal [Neck Cervical Mass (___cm)] : no neck mass was observed [Jugular Venous Distention Increased] : there was no jugular-venous distention [Thyroid Diffuse Enlargement] : the thyroid was not enlarged [Thyroid Nodule] : there were no palpable thyroid nodules [Heart Sounds] : normal S1 and S2 [Heart Rate And Rhythm] : heart rate and rhythm were normal [Murmurs] : no murmurs present [Respiration, Rhythm And Depth] : normal respiratory rhythm and effort [Exaggerated Use Of Accessory Muscles For Inspiration] : no accessory muscle use [Auscultation Breath Sounds / Voice Sounds] : lungs were clear to auscultation bilaterally [Abdomen Soft] : soft [Abdomen Tenderness] : non-tender [Abdomen Mass (___ Cm)] : no abdominal mass palpated [Abnormal Walk] : normal gait [Gait - Sufficient For Exercise Testing] : the gait was sufficient for exercise testing [Nail Clubbing] : no clubbing of the fingernails [Cyanosis, Localized] : no localized cyanosis [Petechial Hemorrhages (___cm)] : no petechial hemorrhages [Skin Color & Pigmentation] : normal skin color and pigmentation [] : no rash [No Venous Stasis] : no venous stasis [Skin Lesions] : no skin lesions [No Skin Ulcers] : no skin ulcer [No Xanthoma] : no  xanthoma was observed [Deep Tendon Reflexes (DTR)] : deep tendon reflexes were 2+ and symmetric [Sensation] : the sensory exam was normal to light touch and pinprick [No Focal Deficits] : no focal deficits [Oriented To Time, Place, And Person] : oriented to person, place, and time [Impaired Insight] : insight and judgment were intact [Affect] : the affect was normal [FreeTextEntry1] : I:E ratio 1:3; clear

## 2019-10-03 ENCOUNTER — APPOINTMENT (OUTPATIENT)
Dept: HEMATOLOGY ONCOLOGY | Facility: CLINIC | Age: 69
End: 2019-10-03
Payer: COMMERCIAL

## 2019-10-03 VITALS
DIASTOLIC BLOOD PRESSURE: 70 MMHG | BODY MASS INDEX: 28.8 KG/M2 | TEMPERATURE: 97.8 F | WEIGHT: 195 LBS | OXYGEN SATURATION: 98 % | RESPIRATION RATE: 14 BRPM | SYSTOLIC BLOOD PRESSURE: 124 MMHG | HEART RATE: 60 BPM

## 2019-10-03 PROCEDURE — 85025 COMPLETE CBC W/AUTO DIFF WBC: CPT

## 2019-10-03 PROCEDURE — 99215 OFFICE O/P EST HI 40 MIN: CPT

## 2019-10-04 LAB
ALBUMIN SERPL ELPH-MCNC: 4.8 G/DL
ALP BLD-CCNC: 52 U/L
ALT SERPL-CCNC: 27 U/L
ANION GAP SERPL CALC-SCNC: 11 MMOL/L
AST SERPL-CCNC: 25 U/L
BILIRUB SERPL-MCNC: 0.8 MG/DL
BUN SERPL-MCNC: 12 MG/DL
CALCIUM SERPL-MCNC: 10.9 MG/DL
CHLORIDE SERPL-SCNC: 104 MMOL/L
CO2 SERPL-SCNC: 28 MMOL/L
CREAT SERPL-MCNC: 0.83 MG/DL
GLUCOSE SERPL-MCNC: 102 MG/DL
LDH SERPL-CCNC: 166 U/L
POTASSIUM SERPL-SCNC: 4.9 MMOL/L
PROT SERPL-MCNC: 6.7 G/DL
SODIUM SERPL-SCNC: 143 MMOL/L
URATE SERPL-MCNC: 6.4 MG/DL

## 2019-10-04 NOTE — ASSESSMENT
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

## 2019-10-04 NOTE — PHYSICAL EXAM
[Fully active, able to carry on all pre-disease performance without restriction] : Status 0 - Fully active, able to carry on all pre-disease performance without restriction [Normal] : affect appropriate [de-identified] : EOMs intact; anicteric sclera; conjunctiva normal [de-identified] : RUBI [de-identified] : no c/c/e

## 2019-10-04 NOTE — RESULTS/DATA
[FreeTextEntry1] : CBC 10/3/19\par WBC 6.6\par ALC 2.45\par ANC  3.19\par Hgb 15.7\par Hct 46.3\par Plt 403

## 2019-10-04 NOTE — HISTORY OF PRESENT ILLNESS
[Disease:__________________________] : Disease: [unfilled] [de-identified] : This is a 68 y old male with CLL (CD23-) s/p splenectomy and FCR x 6 in 2009.\par \par He has had chronic cough and is being followed by Pulm, using nasal irrigation and inhalers which help. No fevers or drenching night sweats. Good appetite. Weight stable. No chest pain or shortness of breath. Energy stable.  [FreeTextEntry1] : Atypical CLL (CD23-), Dx 2002. s/p splenectomy in 2002. FCR x 6 (completed 3/09).  [de-identified] : mutated\par CD38+, ZAP70 -\par nl  FISH

## 2019-10-04 NOTE — REVIEW OF SYSTEMS
[Cough] : cough [Negative] : Allergic/Immunologic [Fever] : no fever [Chills] : no chills [Night Sweats] : no night sweats [Recent Change In Weight] : ~T no recent weight change [Fatigue] : no fatigue [Dysphagia] : no dysphagia [Nosebleeds] : no nosebleeds [Loss of Hearing] : no loss of hearing [Hoarseness] : no hoarseness [Odynophagia] : no odynophagia [Shortness Of Breath] : no shortness of breath [Mucosal Pain] : no mucosal pain [SOB on Exertion] : no shortness of breath during exertion [Wheezing] : no wheezing

## 2019-10-11 ENCOUNTER — APPOINTMENT (OUTPATIENT)
Dept: UROLOGY | Facility: CLINIC | Age: 69
End: 2019-10-11
Payer: COMMERCIAL

## 2019-10-11 DIAGNOSIS — N32.81 OVERACTIVE BLADDER: ICD-10-CM

## 2019-10-11 PROCEDURE — 99214 OFFICE O/P EST MOD 30 MIN: CPT | Mod: 25

## 2019-10-11 PROCEDURE — 51798 US URINE CAPACITY MEASURE: CPT

## 2019-10-11 RX ORDER — HYLAN G-F 20 16MG/2ML
16 SYRINGE (ML) INTRAARTICULAR
Qty: 1 | Refills: 0 | Status: COMPLETED | OUTPATIENT
Start: 2019-07-09 | End: 2019-10-11

## 2019-10-11 NOTE — HISTORY OF PRESENT ILLNESS
[Urinary Urgency] : urinary urgency [Urinary Incontinence] : urinary incontinence [Urinary Frequency] : urinary frequency [None] : None [FreeTextEntry1] : Seen several years ago for difficulty urinating . He states it is getting worse over time and last week he was incontinent . He is on many allergy medication s due to his CLL .

## 2019-10-11 NOTE — PHYSICAL EXAM
[General Appearance - Well Nourished] : well nourished [General Appearance - Well Developed] : well developed [Abdomen Soft] : soft [Prostate Size ___ gm] : prostate size [unfilled] gm [Skin Color & Pigmentation] : normal skin color and pigmentation [Skin Turgor] : supple [] : no respiratory distress [Heart Rate And Rhythm] : Heart rate and rhythm were normal [Oriented To Time, Place, And Person] : oriented to person, place, and time [Normal Station and Gait] : the gait and station were normal for the patient's age [No Focal Deficits] : no focal deficits [No Palpable Adenopathy] : no palpable adenopathy

## 2019-10-11 NOTE — ASSESSMENT
[FreeTextEntry1] : He void small volumes frequently . He has nocturia x 3 small volume and slow stream .\par We discussed the side effects of the medications he is on . He does not have Glaucoma . We will start anticholinergics .\par He will monitor

## 2019-10-13 LAB — BACTERIA UR CULT: NORMAL

## 2019-10-29 ENCOUNTER — TRANSCRIPTION ENCOUNTER (OUTPATIENT)
Age: 69
End: 2019-10-29

## 2019-10-29 ENCOUNTER — RX RENEWAL (OUTPATIENT)
Age: 69
End: 2019-10-29

## 2019-11-04 ENCOUNTER — RX RENEWAL (OUTPATIENT)
Age: 69
End: 2019-11-04

## 2019-11-04 ENCOUNTER — TRANSCRIPTION ENCOUNTER (OUTPATIENT)
Age: 69
End: 2019-11-04

## 2019-11-27 ENCOUNTER — MEDICATION RENEWAL (OUTPATIENT)
Age: 69
End: 2019-11-27

## 2019-11-27 ENCOUNTER — TRANSCRIPTION ENCOUNTER (OUTPATIENT)
Age: 69
End: 2019-11-27

## 2019-11-27 RX ORDER — MONTELUKAST 10 MG/1
10 TABLET, FILM COATED ORAL
Qty: 1 | Refills: 1 | Status: ACTIVE | COMMUNITY
Start: 2019-09-18 | End: 1900-01-01

## 2019-12-09 ENCOUNTER — RX RENEWAL (OUTPATIENT)
Age: 69
End: 2019-12-09

## 2019-12-09 RX ORDER — HYDROCODONE BITARTRATE AND HOMATROPINE METHYLBROMIDE 5; 1.5 MG/5ML; MG/5ML
5-1.5 SOLUTION ORAL
Qty: 1500 | Refills: 0 | Status: ACTIVE | COMMUNITY
Start: 2019-06-15 | End: 1900-01-01

## 2019-12-10 ENCOUNTER — MEDICATION RENEWAL (OUTPATIENT)
Age: 69
End: 2019-12-10

## 2019-12-10 ENCOUNTER — TRANSCRIPTION ENCOUNTER (OUTPATIENT)
Age: 69
End: 2019-12-10

## 2019-12-10 RX ORDER — BENZONATATE 200 MG/1
200 CAPSULE ORAL 3 TIMES DAILY
Qty: 270 | Refills: 1 | Status: ACTIVE | COMMUNITY
Start: 2018-01-10 | End: 1900-01-01

## 2019-12-13 ENCOUNTER — TRANSCRIPTION ENCOUNTER (OUTPATIENT)
Age: 69
End: 2019-12-13

## 2019-12-14 ENCOUNTER — LABORATORY RESULT (OUTPATIENT)
Age: 69
End: 2019-12-14

## 2019-12-18 ENCOUNTER — APPOINTMENT (OUTPATIENT)
Dept: INTERNAL MEDICINE | Facility: CLINIC | Age: 69
End: 2019-12-18
Payer: COMMERCIAL

## 2019-12-18 VITALS
SYSTOLIC BLOOD PRESSURE: 128 MMHG | BODY MASS INDEX: 29.03 KG/M2 | DIASTOLIC BLOOD PRESSURE: 66 MMHG | WEIGHT: 196 LBS | HEART RATE: 75 BPM | TEMPERATURE: 98.4 F | OXYGEN SATURATION: 96 % | HEIGHT: 69 IN

## 2019-12-18 DIAGNOSIS — Z13.39 ENCOUNTER FOR SCREENING EXAM FOR OTHER MENTAL HEALTH AND BEHAVIORAL DISORDERS: ICD-10-CM

## 2019-12-18 DIAGNOSIS — Z13.31 ENCOUNTER FOR SCREENING FOR DEPRESSION: ICD-10-CM

## 2019-12-18 DIAGNOSIS — Z00.00 ENCOUNTER FOR GENERAL ADULT MEDICAL EXAMINATION W/OUT ABNORMAL FINDINGS: ICD-10-CM

## 2019-12-18 LAB
ALBUMIN SERPL ELPH-MCNC: 4.3 G/DL
ALP BLD-CCNC: 48 U/L
ALT SERPL-CCNC: 24 U/L
ANION GAP SERPL CALC-SCNC: 12 MMOL/L
APPEARANCE: CLEAR
AST SERPL-CCNC: 22 U/L
BACTERIA: NEGATIVE
BASOPHILS # BLD AUTO: 0.12 K/UL
BASOPHILS NFR BLD AUTO: 1.6 %
BILIRUB SERPL-MCNC: 0.8 MG/DL
BILIRUBIN URINE: NEGATIVE
BLOOD URINE: NEGATIVE
BUN SERPL-MCNC: 19 MG/DL
CALCIUM SERPL-MCNC: 10.7 MG/DL
CHLORIDE SERPL-SCNC: 108 MMOL/L
CHOLEST SERPL-MCNC: 197 MG/DL
CHOLEST/HDLC SERPL: 5.3 RATIO
CO2 SERPL-SCNC: 26 MMOL/L
COLOR: YELLOW
CREAT SERPL-MCNC: 0.86 MG/DL
EOSINOPHIL # BLD AUTO: 0.67 K/UL
EOSINOPHIL NFR BLD AUTO: 8.8 %
ESTIMATED AVERAGE GLUCOSE: 108 MG/DL
GLUCOSE QUALITATIVE U: NEGATIVE
GLUCOSE SERPL-MCNC: 101 MG/DL
HBA1C MFR BLD HPLC: 5.4 %
HCT VFR BLD CALC: 45.7 %
HDLC SERPL-MCNC: 37 MG/DL
HGB BLD-MCNC: 15.2 G/DL
HIV1+2 AB SPEC QL IA.RAPID: NONREACTIVE
HYALINE CASTS: 0 /LPF
IMM GRANULOCYTES NFR BLD AUTO: 0.4 %
KETONES URINE: NEGATIVE
LDLC SERPL CALC-MCNC: 133 MG/DL
LEUKOCYTE ESTERASE URINE: NEGATIVE
LYMPHOCYTES # BLD AUTO: 2.82 K/UL
LYMPHOCYTES NFR BLD AUTO: 37.1 %
MAN DIFF?: NORMAL
MCHC RBC-ENTMCNC: 33.1 PG
MCHC RBC-ENTMCNC: 33.3 GM/DL
MCV RBC AUTO: 99.6 FL
MICROSCOPIC-UA: NORMAL
MONOCYTES # BLD AUTO: 0.9 K/UL
MONOCYTES NFR BLD AUTO: 11.8 %
NEUTROPHILS # BLD AUTO: 3.07 K/UL
NEUTROPHILS NFR BLD AUTO: 40.3 %
NITRITE URINE: NEGATIVE
PH URINE: 6
PLATELET # BLD AUTO: 406 K/UL
POTASSIUM SERPL-SCNC: 4.8 MMOL/L
PROT SERPL-MCNC: 6.5 G/DL
PROTEIN URINE: NORMAL
PSA SERPL-MCNC: 1.76 NG/ML
RBC # BLD: 4.59 M/UL
RBC # FLD: 13.6 %
RED BLOOD CELLS URINE: 3 /HPF
SODIUM SERPL-SCNC: 146 MMOL/L
SPECIFIC GRAVITY URINE: 1.03
SQUAMOUS EPITHELIAL CELLS: 0 /HPF
TRIGL SERPL-MCNC: 136 MG/DL
TSH SERPL-ACNC: 4.84 UIU/ML
UROBILINOGEN URINE: NORMAL
WBC # FLD AUTO: 7.61 K/UL
WHITE BLOOD CELLS URINE: 1 /HPF

## 2019-12-18 PROCEDURE — G0442 ANNUAL ALCOHOL SCREEN 15 MIN: CPT | Mod: NC,59

## 2019-12-18 PROCEDURE — 99397 PER PM REEVAL EST PAT 65+ YR: CPT

## 2019-12-18 PROCEDURE — G0444 DEPRESSION SCREEN ANNUAL: CPT | Mod: NC,59

## 2019-12-18 NOTE — PHYSICAL EXAM
[Prostate Tenderness] : the prostate was not tender [Prostate Tenderness] : was not tender [Prostate Enlarged] : was enlarged [Prostate Nodule] : did not have a nodule [Prostate Fluctuant] : was not fluctuant [General Appearance - Alert] : alert [PERRL With Normal Accommodation] : pupils were equal in size, round, and reactive to light [Sclera] : the sclera and conjunctiva were normal [General Appearance - In No Acute Distress] : in no acute distress [Oropharynx] : the oropharynx was normal [Outer Ear] : the ears and nose were normal in appearance [Extraocular Movements] : extraocular movements were intact [Neck Appearance] : the appearance of the neck was normal [Jugular Venous Distention Increased] : there was no jugular-venous distention [Neck Cervical Mass (___cm)] : no neck mass was observed [Thyroid Diffuse Enlargement] : the thyroid was not enlarged [Thyroid Nodule] : there were no palpable thyroid nodules [Heart Sounds] : normal S1 and S2 [Heart Rate And Rhythm] : heart rate was normal and rhythm regular [Auscultation Breath Sounds / Voice Sounds] : lungs were clear to auscultation bilaterally [Murmurs] : no murmurs [Heart Sounds Pericardial Friction Rub] : no pericardial rub [Heart Sounds Gallop] : no gallops [Full Pulse] : the pedal pulses are present [Edema] : there was no peripheral edema [Bowel Sounds] : normal bowel sounds [Abdomen Tenderness] : non-tender [Abdomen Soft] : soft [Abdomen Mass (___ Cm)] : no abdominal mass palpated [Cervical Lymph Nodes Enlarged Posterior Bilaterally] : posterior cervical [Axillary Lymph Nodes Enlarged Bilaterally] : axillary [Cervical Lymph Nodes Enlarged Anterior Bilaterally] : anterior cervical [Supraclavicular Lymph Nodes Enlarged Bilaterally] : supraclavicular [Inguinal Lymph Nodes Enlarged Bilaterally] : inguinal [Femoral Lymph Nodes Enlarged Bilaterally] : femoral [Abnormal Walk] : normal gait [No CVA Tenderness] : no ~M costovertebral angle tenderness [No Spinal Tenderness] : no spinal tenderness [Motor Tone] : muscle strength and tone were normal [Nail Clubbing] : no clubbing  or cyanosis of the fingernails [Musculoskeletal - Swelling] : no joint swelling seen [Skin Turgor] : normal skin turgor [Skin Color & Pigmentation] : normal skin color and pigmentation [] : no rash [Motor Exam] : the motor exam was normal [Sensation] : the sensory exam was normal to light touch and pinprick [No Focal Deficits] : no focal deficits [Oriented To Time, Place, And Person] : oriented to person, place, and time [Impaired Insight] : insight and judgment were intact [Affect] : the affect was normal

## 2019-12-18 NOTE — ASSESSMENT
[FreeTextEntry1] : \par 69 year old man with rhinitis, CLL, BPH presents for CPE.\par \par 1. CLL: cont f/u with Heme Dr. Martin\par \par 2. BPH: cont f/u with ; check PSA with labs\par \par 3. Rhinitis: cont Azelastine\par \par 4. HCM: pt saw GI for colonoscopy last year, was told ok; needs flu and Pneumovax but will get at pharmacy next week since he is just getting over URI; check PSA with labs\par \par Depression screen performed today, PHQ2=0\par \par Annual alcohol misuse screen, 15 mins, done; negative

## 2019-12-23 LAB
TESTOST BND SERPL-MCNC: 9.9 PG/ML
TESTOST SERPL-MCNC: 525.9 NG/DL

## 2020-01-22 ENCOUNTER — APPOINTMENT (OUTPATIENT)
Dept: PULMONOLOGY | Facility: CLINIC | Age: 70
End: 2020-01-22
Payer: COMMERCIAL

## 2020-01-22 ENCOUNTER — NON-APPOINTMENT (OUTPATIENT)
Age: 70
End: 2020-01-22

## 2020-01-22 VITALS
HEART RATE: 67 BPM | OXYGEN SATURATION: 98 % | SYSTOLIC BLOOD PRESSURE: 120 MMHG | HEIGHT: 69 IN | BODY MASS INDEX: 29.03 KG/M2 | RESPIRATION RATE: 17 BRPM | DIASTOLIC BLOOD PRESSURE: 70 MMHG | WEIGHT: 196 LBS

## 2020-01-22 DIAGNOSIS — R09.81 NASAL CONGESTION: ICD-10-CM

## 2020-01-22 PROCEDURE — 94010 BREATHING CAPACITY TEST: CPT

## 2020-01-22 PROCEDURE — 99214 OFFICE O/P EST MOD 30 MIN: CPT | Mod: 25

## 2020-01-22 PROCEDURE — 95012 NITRIC OXIDE EXP GAS DETER: CPT

## 2020-01-22 NOTE — PHYSICAL EXAM
[General Appearance - Well Developed] : well developed [Normal Appearance] : normal appearance [General Appearance - Well Nourished] : well nourished [No Deformities] : no deformities [Well Groomed] : well groomed [Normal Conjunctiva] : the conjunctiva exhibited no abnormalities [General Appearance - In No Acute Distress] : no acute distress [Eyelids - No Xanthelasma] : the eyelids demonstrated no xanthelasmas [Normal Oropharynx] : normal oropharynx [III] : III [Neck Appearance] : the appearance of the neck was normal [Neck Cervical Mass (___cm)] : no neck mass was observed [Jugular Venous Distention Increased] : there was no jugular-venous distention [Thyroid Nodule] : there were no palpable thyroid nodules [Heart Sounds] : normal S1 and S2 [Thyroid Diffuse Enlargement] : the thyroid was not enlarged [Heart Rate And Rhythm] : heart rate and rhythm were normal [Murmurs] : no murmurs present [Auscultation Breath Sounds / Voice Sounds] : lungs were clear to auscultation bilaterally [Respiration, Rhythm And Depth] : normal respiratory rhythm and effort [Exaggerated Use Of Accessory Muscles For Inspiration] : no accessory muscle use [Abdomen Tenderness] : non-tender [Abdomen Soft] : soft [Abdomen Mass (___ Cm)] : no abdominal mass palpated [Abnormal Walk] : normal gait [Nail Clubbing] : no clubbing of the fingernails [Gait - Sufficient For Exercise Testing] : the gait was sufficient for exercise testing [Cyanosis, Localized] : no localized cyanosis [Petechial Hemorrhages (___cm)] : no petechial hemorrhages [Skin Color & Pigmentation] : normal skin color and pigmentation [Skin Lesions] : no skin lesions [No Venous Stasis] : no venous stasis [] : no rash [No Skin Ulcers] : no skin ulcer [Deep Tendon Reflexes (DTR)] : deep tendon reflexes were 2+ and symmetric [No Xanthoma] : no  xanthoma was observed [No Focal Deficits] : no focal deficits [Sensation] : the sensory exam was normal to light touch and pinprick [Impaired Insight] : insight and judgment were intact [Affect] : the affect was normal [Oriented To Time, Place, And Person] : oriented to person, place, and time [FreeTextEntry1] : I:E ratio 1:3; clear

## 2020-01-22 NOTE — ADDENDUM
[FreeTextEntry1] : Documented by Roni Chow acting as a scribe for Dr. Jose Martin Angulo on 01/22/2020.\par \par All medical record entries made by the Scribe were at my, Dr. Jose Martin Angulo's, direction and personally dictated by me on 01/22/2020. I have reviewed the chart and agree that the record accurately reflects my personal performance of the history, physical exam, assessment and plan. I have also personally directed, reviewed, and agree with the discharge instructions. \par

## 2020-01-22 NOTE — HISTORY OF PRESENT ILLNESS
[FreeTextEntry1] : Mr. Acosta is a 68 year old male with a history of allergic rhinitis, cough, asthma, eosinophilia, GERD, low vitamin D/testosterone, non allergic rhinitis, PND, sinus congestion, sleep apnea presenting to the office today for a follow up visit. Her chief complaint is chronic cough\par -he notes that 3 weeks ago his sinus were very congested. \par -he states that  he has dysphagia occasionally. \par -He notes His bowels are regular \par -he states that sense of taste and smell is very bad. \par -he states that his weight is stable. \par -he notes that he exercises well. \par -he states that he is able to tolerate a work out. \par -he notes that his energy level is 8/10. \par -he denies taking any new medications, vitamins, or supplements. \par -he reports that getting some myalgias and arthralgias as a result of aging. \par -he denies SOB, orthopnea, and wheezing. \par -he denies of having a rash. \par -he states that his cough has been going on for a long time. \par -he notes that his cough is worse in January and February. \par -he notes that he has chronic sinusitis. \par \par -he denies any chest pain, chest pressure, diarrhea, constipation, dizziness, sour taste in the mouth, leg swelling, leg pain, itchy eyes, itchy ears, heartburn, or reflux

## 2020-01-22 NOTE — PROCEDURE
[FreeTextEntry1] : PFT revealed normal flows, with a FEV1 of 3.61L, which is 111% of predicted, with a normal flow volume loop \par \par FENO was 41; a normal value being less than 25\par Fractional exhaled nitric oxide (FENO) is regarded as a simple, noninvasive method for assessing eosinophilic airway inflammation. Produced by a variety of cells within the lung, nitric oxide (NO) concentrations are generally low in healthy individuals. However, high concentrations of NO appear to be involved in nonspecific host defense mechanisms and chronic inflammatory diseases such as asthma. The American Thor[employer] Society (ATS) therefore has recommended using FENO to aid in the diagnosis and monitoring of eosinophilic airway inflammation and asthma, and for identifying steroid responsive individuals whose chronic respiratory symptoms may be caused by airway inflammation.

## 2020-01-22 NOTE — ASSESSMENT
[FreeTextEntry1] : Mr. Acosta is a 69 year old male with a history of Eosinophilic Asthma, chronic sinusitis, RADs / allergy sinus / CLL / GERD  who presents to the office for a follow up visit. \par \par His shortness of breath (quiet) is multifactorial due to:\par -post nasal drip syndrome\par -RADS\par -eosinophilic bronchitis/ Asthma \par -GERD\par -CLL: f/u with Dr. Martin and Dr. Trev Gastelum\par \par problem 1: post nasal drip- Chronic Sinusitis \par -continue to use saline\par -Continue Olopatadine 0.6% 1 sniff BID \par - Continue Flonase 1 sniff/nostril BID \par -continue Clarinex 5 mg before bed\par -recommended to use "Navage" nasal rinse device \par -continue Palgic 4 mg up to TID \par \par -Environmental measures for allergies were encouraged including mattress and pillow cover, air purifier, and environmental controls.\par \par problem 2: eosinophilic bronchitis (Resolved)/ Eosinophilia 10/2018- Eosinophilic Asthma \par -set up Dupixent \par Dupixent is a prescription medicine used with other asthma medicines for the maintenance treatment of moderate-to-severe asthma in people aged 12 years and older whose asthma is not controlled with their current asthma medicines. Dupixent helps prevent severe asthma attacks (exacerbations) and can improve your breathing. Dupixent may also help reduce the amount of oral corticosteroids you need while preventing severe asthma attacks and improving your breathing. Dupixent is not used to treat sudden breathing problems. Risks and side effect of Dupixent were discussed and reviewed with patient.\par -being recommended to use Ventolin PRN and before exercise/Symbicort\par -continue Symbicort 160 at 2 inhalations BID prn\par -continue QVar 80 at 2 inhalations BID\par \par -Inhaler technique reviewed as well as oral hygiene techniques reviewed with patient. Avoidance of cold air, extremes of temperature, rescue inhaler should be used before exercise. Order of medication reviewed with patient. Recommended use of a cool mist humidifier in the bedroom. 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 It sometimes develops following a URI, herpes zoster outbreak in pharynx or thyroid or cervical spine injury. However, many patients have no identifiable antecedent explanation. \par Any cough greater than three weeks duration-differential diagnosis includes-asthma, upper airway cough syndrome, post nasal drip syndrome, gastroesophageal reflux, laryngopharyngeal reflux, cardiac disease (congestive heart failure, medicines, effects, etc), medication effects (b-blockers, ace inhibitors, ARBs, glaucoma meds, etc.), smoking, infectious, multifactorial, etc \par \par \par problem 4: GERD\par -continue to use Zantac 300 mg QHS\par \par -Rule of 2s: avoid eating too much, eating too late, eating too spicy, eating two hours before bed\par -Things to avoid including overeating, spicy foods, tight clothing, eating within three hours of bed, this list is not all inclusive. \par -For treatment of reflux, possible options discussed including diet control, H2 blockers, PPIs, as well as coating motility agents discussed as treatment options. Timing of meals and proximity of last meal to sleep were discussed. If symptoms persist, a formal gastrointestinal evaluation is needed.\par \par problem 5: low vitamin D\par -continue to use supplemental vitamin D supplementation\par \par -Has been associated with asthma exacerbations and increased allergic symptoms. The goal based on recent information is maintaining levels between 50-70 and low normal is 30. Recommended 50,000 units every two weeks to once a month depending on the level. \par \par problem 6: OSAS\par -sleep apnea revealed moderate sleep apnea\par - Using oral appliance \par - S/p referral to Dr. Sivakumar Casarez and Dr. Okeefe\par \par -Treatment options discussed including CPAP/BiPAP machine, oral appliance, ProVent therapy, Oxy-Aid by Respitec, new technologies, or positional sleep.Recommended use of the CPAP machine for moderate (AHI >15), moderate to severe (AHI 15-30) and severe patients (AHI > 30). Recommended weight loss which can reduce AHI especially in weight loss of greater than 5% of BMI. Positional sleep is recommended in those with low AHI, low-moderate BMI, and younger age. For severe sleep apnea, the hypoglossal nerve stimulator was recommended as well. \par -Sleep apnea is associated with adverse clinical consequences which an affect most organ systems.  Cardiovascular disease risk includes arrhythmias, atrial fibrillation, hypertension, coronary artery disease, and stroke. Metabolic disorders include diabetes type 2, non-alcoholic fatty liver disease. Mood disorder especially depression; and cognitive decline especially in the elderly. Associations with  chronic reflux/Kyle’s esophagus some but not all inclusive. \par -Reasons to assess this include arousal consistent with hypopnea; respiratory events most prominent in REM sleep or supine position; therefore sleep staging and body position are important for accurate diagnosis and estimation of AHI. \par \par problem 7: overweight\par -Weight loss, exercise, and diet control were discussed and are highly encouraged. Treatment options were given such as, aqua therapy, and contacting a nutritionist. Recommended to use the elliptical, stationary bike, less use of treadmill.  Obesity is associated with worsening asthma, shortness of breath, and potential for cardiac disease, diabetes, and other underlying medical conditions. \par \par problem 8: health maintenance \par - Received an influenza vaccine 2018\par - Received Prevnar-13 vaccine 2017\par - Received Pneumo vaccine 23 2018\par -recommended early intervention for URIs\par -recommended regular osteoporosis evaluations\par -recommended early dermatological evaluations\par -recommended after the age of 50 to the age of 70, colonoscopy every 5 years \par \par F/U in 6 months with SPI and NiOx\par He is encouraged to call with any changes, concerns, or questions.

## 2020-01-24 ENCOUNTER — TRANSCRIPTION ENCOUNTER (OUTPATIENT)
Age: 70
End: 2020-01-24

## 2020-02-19 ENCOUNTER — TRANSCRIPTION ENCOUNTER (OUTPATIENT)
Age: 70
End: 2020-02-19

## 2020-02-21 ENCOUNTER — TRANSCRIPTION ENCOUNTER (OUTPATIENT)
Age: 70
End: 2020-02-21

## 2020-02-21 RX ORDER — HYDROCODONE BITARTRATE AND HOMATROPINE METHYLBROMIDE 5; 1.5 MG/5ML; MG/5ML
5-1.5 SOLUTION ORAL EVERY 4 HOURS
Qty: 1500 | Refills: 0 | Status: ACTIVE | COMMUNITY
Start: 2019-01-31 | End: 1900-01-01

## 2020-02-24 ENCOUNTER — TRANSCRIPTION ENCOUNTER (OUTPATIENT)
Age: 70
End: 2020-02-24

## 2020-03-16 ENCOUNTER — TRANSCRIPTION ENCOUNTER (OUTPATIENT)
Age: 70
End: 2020-03-16

## 2020-03-16 RX ORDER — DESLORATADINE 5 MG/1
5 TABLET ORAL DAILY
Qty: 90 | Refills: 1 | Status: ACTIVE | COMMUNITY
Start: 2019-09-18 | End: 1900-01-01

## 2020-03-18 RX ORDER — HYDROCODONE BITARTRATE AND HOMATROPINE METHYLBROMIDE 5; 1.5 MG/5ML; MG/5ML
5-1.5 SYRUP ORAL
Qty: 1500 | Refills: 0 | Status: ACTIVE | COMMUNITY
Start: 2019-09-05 | End: 1900-01-01

## 2020-03-24 ENCOUNTER — TRANSCRIPTION ENCOUNTER (OUTPATIENT)
Age: 70
End: 2020-03-24

## 2020-04-07 ENCOUNTER — TRANSCRIPTION ENCOUNTER (OUTPATIENT)
Age: 70
End: 2020-04-07

## 2020-04-20 ENCOUNTER — APPOINTMENT (OUTPATIENT)
Dept: PULMONOLOGY | Facility: CLINIC | Age: 70
End: 2020-04-20
Payer: COMMERCIAL

## 2020-04-20 VITALS
DIASTOLIC BLOOD PRESSURE: 65 MMHG | SYSTOLIC BLOOD PRESSURE: 120 MMHG | TEMPERATURE: 98.1 F | HEIGHT: 68 IN | BODY MASS INDEX: 28.79 KG/M2 | HEART RATE: 63 BPM | OXYGEN SATURATION: 98 % | RESPIRATION RATE: 17 BRPM | WEIGHT: 190 LBS

## 2020-04-20 DIAGNOSIS — J45.991 COUGH VARIANT ASTHMA: ICD-10-CM

## 2020-04-20 PROCEDURE — 99214 OFFICE O/P EST MOD 30 MIN: CPT | Mod: 25

## 2020-04-20 RX ORDER — CLOBETASOL PROPIONATE 0.5 MG/G
0.05 CREAM TOPICAL 3 TIMES DAILY
Qty: 60 | Refills: 1 | Status: ACTIVE | COMMUNITY
Start: 2020-04-20 | End: 1900-01-01

## 2020-04-20 RX ORDER — HYDROCODONE BITARTRATE AND HOMATROPINE METHYLBROMIDE 5; 1.5 MG/5ML; MG/5ML
5-1.5 SYRUP ORAL
Qty: 1500 | Refills: 0 | Status: ACTIVE | COMMUNITY
Start: 2020-04-20 | End: 1900-01-01

## 2020-04-20 NOTE — ASSESSMENT
[FreeTextEntry1] : Mr. Acosta is a 69 year old male with a history of Eosinophilic Asthma, chronic sinusitis, RADs / allergy sinus / CLL / GERD  who presents to the office for a follow up visit. - PND aggregating Asthma (?Dupixent response) \par \par His shortness of breath (quiet) is multifactorial due to:\par -post nasal drip syndrome\par -RADS\par -eosinophilic bronchitis/ Asthma \par -GERD\par -CLL: f/u with Dr. Martin and Dr. Trev Gastelum\par \par problem 1: post nasal drip- Chronic Sinusitis \par -continue to use saline\par -Continue Olopatadine 0.6% 1 sniff BID \par -Continue Flonase 1 sniff/nostril BID \par -continue Clarinex 5 mg before bed\par -recommended to use "Navage" nasal rinse device \par -continue Palgic 4 mg up to TID \par \par -Environmental measures for allergies were encouraged including mattress and pillow cover, air purifier, and environmental controls.\par \par problem 2: eosinophilic bronchitis (Resolved)/ Eosinophilia 10/2018- Eosinophilic Asthma \par -Dupixent in place x2 months \par Dupixent is a prescription medicine used with other asthma medicines for the maintenance treatment of moderate-to-severe asthma in people aged 12 years and older whose asthma is not controlled with their current asthma medicines. Dupixent helps prevent severe asthma attacks (exacerbations) and can improve your breathing. Dupixent may also help reduce the amount of oral corticosteroids you need while preventing severe asthma attacks and improving your breathing. Dupixent is not used to treat sudden breathing problems. Risks and side effect of Dupixent were discussed and reviewed with patient.\par -being recommended to use Ventolin PRN and before exercise/Symbicort\par -continue Symbicort 160 at 2 inhalations BID prn\par -continue QVar 80 at 2 inhalations BID\par \par -Inhaler technique reviewed as well as oral hygiene techniques reviewed with patient. Avoidance of cold air, extremes of temperature, rescue inhaler should be used before exercise. Order of medication reviewed with patient. Recommended use of a cool mist humidifier in the bedroom. 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 It sometimes develops following a URI, herpes zoster outbreak in pharynx or thyroid or cervical spine injury. However, many patients have no identifiable antecedent explanation. \par Any cough greater than three weeks duration-differential diagnosis includes-asthma, upper airway cough syndrome, post nasal drip syndrome, gastroesophageal reflux, laryngopharyngeal reflux, cardiac disease (congestive heart failure, medicines, effects, etc), medication effects (b-blockers, ace inhibitors, ARBs, glaucoma meds, etc.), smoking, infectious, multifactorial, etc \par \par \par problem 4: GERD\par -continue to use Zantac 300 mg QHS\par \par -Rule of 2s: avoid eating too much, eating too late, eating too spicy, eating two hours before bed\par -Things to avoid including overeating, spicy foods, tight clothing, eating within three hours of bed, this list is not all inclusive. \par -For treatment of reflux, possible options discussed including diet control, H2 blockers, PPIs, as well as coating motility agents discussed as treatment options. Timing of meals and proximity of last meal to sleep were discussed. If symptoms persist, a formal gastrointestinal evaluation is needed.\par \par problem 5: low vitamin D\par -continue to use supplemental vitamin D supplementation\par \par -Has been associated with asthma exacerbations and increased allergic symptoms. The goal based on recent information is maintaining levels between 50-70 and low normal is 30. Recommended 50,000 units every two weeks to once a month depending on the level. \par \par problem 6: OSAS\par -sleep apnea revealed moderate sleep apnea\par - Using oral appliance \par - S/p referral to Dr. Sivakumar Casarez and Dr. Okeefe\par \par -Treatment options discussed including CPAP/BiPAP machine, oral appliance, ProVent therapy, Oxy-Aid by Respitec, new technologies, or positional sleep.Recommended use of the CPAP machine for moderate (AHI >15), moderate to severe (AHI 15-30) and severe patients (AHI > 30). Recommended weight loss which can reduce AHI especially in weight loss of greater than 5% of BMI. Positional sleep is recommended in those with low AHI, low-moderate BMI, and younger age. For severe sleep apnea, the hypoglossal nerve stimulator was recommended as well. \par -Sleep apnea is associated with adverse clinical consequences which an affect most organ systems.  Cardiovascular disease risk includes arrhythmias, atrial fibrillation, hypertension, coronary artery disease, and stroke. Metabolic disorders include diabetes type 2, non-alcoholic fatty liver disease. Mood disorder especially depression; and cognitive decline especially in the elderly. Associations with  chronic reflux/Kyle’s esophagus some but not all inclusive. \par -Reasons to assess this include arousal consistent with hypopnea; respiratory events most prominent in REM sleep or supine position; therefore sleep staging and body position are important for accurate diagnosis and estimation of AHI. \par \par problem 7: overweight\par -Weight loss, exercise, and diet control were discussed and are highly encouraged. Treatment options were given such as, aqua therapy, and contacting a nutritionist. Recommended to use the elliptical, stationary bike, less use of treadmill.  Obesity is associated with worsening asthma, shortness of breath, and potential for cardiac disease, diabetes, and other underlying medical conditions. \par \par Problem 8: Health Maintenance/COVID19 Precautions:\par - Clean your hands often. Wash your hands often with soap and water for at least 20 seconds, especially after blowing your nose, coughing, or sneezing, or having been in a public place.\par - If soap and water are not available, use a hand  that contains at least 60% alcohol.\par - To the extent possible, avoid touching high-touch surfaces in public places - elevator buttons, door handles, handrails, handshaking with people, etc. Use a tissue or your sleeve to cover your hand or finger if you must touch something.\par - Wash your hands after touching surfaces in public places.\par - Avoid touching your face, nose, eyes, etc.\par - Clean and disinfect your home to remove germs: practice routine cleaning of frequently touched surfaces (for example: tables, doorknobs, light switches, handles, desks, toilets, faucets, sinks & cell phones)\par - Avoid crowds, especially in poorly ventilated spaces. Your risk of exposure to respiratory viruses like COVID-19 may increase in crowded, closed-in settings with little air circulation if\par there are people in the crowd who are sick. All patients are recommended to practice social distancing and stay at least 6 feet away from others.\par - Avoid all non-essential travel including plane trips, and especially avoid embarking on cruise ships.\par -If COVID-19 is spreading in your community, take extra measures to put distance between yourself and other people to further reduce your risk of being exposed to this new virus.\par -Stay home as much as possible.\par - Consider ways of getting food brought to your house through family, social, or commercial networks\par -Be aware that the virus has been known to live in the air up to 3 hours post exposure, cardboard up to 24 hours post exposure, copper up to 4 hours post exposure, steel and plastic up to 2-3 days post exposure. Risk of transmission from these surfaces are affected by many variables.\par \par Immune Support Recommendations:\par -OTC Vitamin C 500mg BID \par -OTC Quercetin 250-500mg BID \par -OTC Zinc 75-100mg per day \par -OTC Melatonin 1or 2mg a night \par -OTC Vitamin D 1-4000mg per day\par -Tonic Water 8oz\par -Recommended to Stay Hydrated (At least a gallon/day)\par \par Asthma and COVID19:\par You need to make sure your asthma is under control. This often requires the use of inhaled corticosteroids (and sometimes oral corticosteroids). Inhaled corticosteroids do not likely reduce your immune system’s ability to fight infections, but oral corticosteroids may. It is important to use the steps above to protect yourself to limit your exposure to any respiratory virus. \par \par problem 9: health maintenance \par - Received an influenza vaccine 2019\par - Received Prevnar-13 vaccine 2017\par - Received Pneumo vaccine 23 2018\par -recommended early intervention for URIs\par -recommended regular osteoporosis evaluations\par -recommended early dermatological evaluations\par -recommended after the age of 50 to the age of 70, colonoscopy every 5 years \par \par F/U in 6 months with SPI and NiOx\par He is encouraged to call with any changes, concerns, or questions.

## 2020-04-20 NOTE — ADDENDUM
[FreeTextEntry1] : Documented by Leonardo Emmanuel acting as a scribe for Dr. Jose Martin Angulo on 04/20/2020 \par \par All medical record entries made by the Scribe were at my, Dr. Jose Martin Angulo's, direction and personally dictated by me on 04/20/2020 . I have reviewed the chart and agree that the record accurately reflects my personal performance of the history, physical exam, assessment and plan. I have also personally directed, reviewed, and agree with the discharge instructions.

## 2020-04-20 NOTE — HISTORY OF PRESENT ILLNESS
[FreeTextEntry1] : Mr. Acosta is a 68 year old male with a history of allergic rhinitis, cough, asthma, eosinophilia, GERD, low vitamin D/testosterone, non allergic rhinitis, PND, sinus congestion, sleep apnea presenting to the office today for a follow up visit. Her chief complaint is the cough. \par -he has been suffering a lot due to his cough and not being able to get his cough medication. \par -feels that the Dupixent has not helped with his cough. \par -has been more SOB. \par -feels that his sinuses are causing all his issues. \par -cough is worse during the morning hours. \par -he notes that His bowels are regular. \par -he reports that his weight is stable \par -sleep is alright; using the dental device. \par -tries to go for a walk around the neighborhood everyday. \par \par -He denies any chest pain, chest pressure, diarrhea, constipation, dysphagia, dizziness, sour taste in the mouth, leg swelling, leg pain, itchy eyes, itchy ears, heartburn, reflux, myalgias or arthralgias.

## 2020-04-20 NOTE — PHYSICAL EXAM
[General Appearance - Well Developed] : well developed [Well Groomed] : well groomed [Normal Appearance] : normal appearance [No Deformities] : no deformities [General Appearance - In No Acute Distress] : no acute distress [General Appearance - Well Nourished] : well nourished [Normal Oropharynx] : normal oropharynx [Eyelids - No Xanthelasma] : the eyelids demonstrated no xanthelasmas [Normal Conjunctiva] : the conjunctiva exhibited no abnormalities [Neck Cervical Mass (___cm)] : no neck mass was observed [III] : III [Neck Appearance] : the appearance of the neck was normal [Jugular Venous Distention Increased] : there was no jugular-venous distention [Heart Rate And Rhythm] : heart rate and rhythm were normal [Thyroid Diffuse Enlargement] : the thyroid was not enlarged [Thyroid Nodule] : there were no palpable thyroid nodules [Respiration, Rhythm And Depth] : normal respiratory rhythm and effort [Murmurs] : no murmurs present [Heart Sounds] : normal S1 and S2 [Auscultation Breath Sounds / Voice Sounds] : lungs were clear to auscultation bilaterally [Exaggerated Use Of Accessory Muscles For Inspiration] : no accessory muscle use [Abdomen Tenderness] : non-tender [Abdomen Soft] : soft [Abdomen Mass (___ Cm)] : no abdominal mass palpated [Abnormal Walk] : normal gait [Gait - Sufficient For Exercise Testing] : the gait was sufficient for exercise testing [Petechial Hemorrhages (___cm)] : no petechial hemorrhages [Cyanosis, Localized] : no localized cyanosis [Nail Clubbing] : no clubbing of the fingernails [] : no rash [No Venous Stasis] : no venous stasis [Skin Color & Pigmentation] : normal skin color and pigmentation [Skin Lesions] : no skin lesions [No Skin Ulcers] : no skin ulcer [No Xanthoma] : no  xanthoma was observed [Sensation] : the sensory exam was normal to light touch and pinprick [No Focal Deficits] : no focal deficits [Oriented To Time, Place, And Person] : oriented to person, place, and time [Deep Tendon Reflexes (DTR)] : deep tendon reflexes were 2+ and symmetric [Impaired Insight] : insight and judgment were intact [Affect] : the affect was normal [FreeTextEntry1] : I:E ratio 1:3; clear

## 2020-04-23 LAB
ALBUMIN SERPL ELPH-MCNC: 4.2 G/DL
ALP BLD-CCNC: 52 U/L
ALT SERPL-CCNC: 24 U/L
ANION GAP SERPL CALC-SCNC: 10 MMOL/L
AST SERPL-CCNC: 23 U/L
BASOPHILS # BLD AUTO: 0.11 K/UL
BASOPHILS NFR BLD AUTO: 1.5 %
BILIRUB SERPL-MCNC: 0.5 MG/DL
BUN SERPL-MCNC: 11 MG/DL
CALCIUM SERPL-MCNC: 10.5 MG/DL
CHLORIDE SERPL-SCNC: 104 MMOL/L
CO2 SERPL-SCNC: 29 MMOL/L
CREAT SERPL-MCNC: 0.87 MG/DL
EOSINOPHIL # BLD AUTO: 0.78 K/UL
EOSINOPHIL NFR BLD AUTO: 10.8 %
GLUCOSE SERPL-MCNC: 90 MG/DL
HCT VFR BLD CALC: 45.7 %
HGB BLD-MCNC: 14.8 G/DL
IMM GRANULOCYTES NFR BLD AUTO: 0.4 %
LDH SERPL-CCNC: 150 U/L
LYMPHOCYTES # BLD AUTO: 3.22 K/UL
LYMPHOCYTES NFR BLD AUTO: 44.8 %
MAGNESIUM SERPL-MCNC: 1.9 MG/DL
MAN DIFF?: NORMAL
MCHC RBC-ENTMCNC: 32.3 PG
MCHC RBC-ENTMCNC: 32.4 GM/DL
MCV RBC AUTO: 99.8 FL
MONOCYTES # BLD AUTO: 0.92 K/UL
MONOCYTES NFR BLD AUTO: 12.8 %
NEUTROPHILS # BLD AUTO: 2.13 K/UL
NEUTROPHILS NFR BLD AUTO: 29.7 %
PHOSPHATE SERPL-MCNC: 3.1 MG/DL
PLATELET # BLD AUTO: 380 K/UL
POTASSIUM SERPL-SCNC: 4.4 MMOL/L
PROT SERPL-MCNC: 6.2 G/DL
RBC # BLD: 4.58 M/UL
RBC # FLD: 13.6 %
SODIUM SERPL-SCNC: 143 MMOL/L
URATE SERPL-MCNC: 6.6 MG/DL
WBC # FLD AUTO: 7.19 K/UL

## 2020-04-27 ENCOUNTER — RX RENEWAL (OUTPATIENT)
Age: 70
End: 2020-04-27

## 2020-04-28 LAB
ALBUMIN SERPL ELPH-MCNC: 4.5 G/DL
ALP BLD-CCNC: 54 U/L
ALT SERPL-CCNC: 25 U/L
ANION GAP SERPL CALC-SCNC: 10 MMOL/L
AST SERPL-CCNC: 29 U/L
BASOPHILS # BLD AUTO: 0.12 K/UL
BASOPHILS NFR BLD AUTO: 1.5 %
BILIRUB SERPL-MCNC: 0.5 MG/DL
BUN SERPL-MCNC: 15 MG/DL
CALCIUM SERPL-MCNC: 11.1 MG/DL
CHLORIDE SERPL-SCNC: 103 MMOL/L
CO2 SERPL-SCNC: 28 MMOL/L
CREAT SERPL-MCNC: 0.81 MG/DL
EOSINOPHIL # BLD AUTO: 0.71 K/UL
EOSINOPHIL NFR BLD AUTO: 8.6 %
GLUCOSE SERPL-MCNC: 104 MG/DL
HCT VFR BLD CALC: 47 %
HGB BLD-MCNC: 15.2 G/DL
IMM GRANULOCYTES NFR BLD AUTO: 0.4 %
LDH SERPL-CCNC: 155 U/L
LYMPHOCYTES # BLD AUTO: 3.03 K/UL
LYMPHOCYTES NFR BLD AUTO: 36.7 %
MAGNESIUM SERPL-MCNC: 2 MG/DL
MAN DIFF?: NORMAL
MCHC RBC-ENTMCNC: 32.3 GM/DL
MCHC RBC-ENTMCNC: 32.3 PG
MCV RBC AUTO: 99.8 FL
MONOCYTES # BLD AUTO: 0.91 K/UL
MONOCYTES NFR BLD AUTO: 11 %
NEUTROPHILS # BLD AUTO: 3.46 K/UL
NEUTROPHILS NFR BLD AUTO: 41.8 %
PHOSPHATE SERPL-MCNC: 2.7 MG/DL
PLATELET # BLD AUTO: 375 K/UL
POTASSIUM SERPL-SCNC: 5.2 MMOL/L
PROT SERPL-MCNC: 6.7 G/DL
RBC # BLD: 4.71 M/UL
RBC # FLD: 13.8 %
SODIUM SERPL-SCNC: 142 MMOL/L
URATE SERPL-MCNC: 6.5 MG/DL
WBC # FLD AUTO: 8.26 K/UL

## 2020-05-12 ENCOUNTER — APPOINTMENT (OUTPATIENT)
Dept: HEMATOLOGY ONCOLOGY | Facility: CLINIC | Age: 70
End: 2020-05-12
Payer: MEDICARE

## 2020-05-12 ENCOUNTER — OUTPATIENT (OUTPATIENT)
Dept: OUTPATIENT SERVICES | Facility: HOSPITAL | Age: 70
LOS: 1 days | Discharge: HOME | End: 2020-05-12

## 2020-05-12 DIAGNOSIS — C91.90 LYMPHOID LEUKEMIA, UNSPECIFIED NOT HAVING ACHIEVED REMISSION: ICD-10-CM

## 2020-05-12 DIAGNOSIS — D64.9 ANEMIA, UNSPECIFIED: ICD-10-CM

## 2020-05-12 PROCEDURE — 99212 OFFICE O/P EST SF 10 MIN: CPT | Mod: 95

## 2020-05-12 NOTE — ASSESSMENT
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 \par \par Plan:\par 1)CBC reviewed, counts remain excellent\par 2)feels well, no indication for CLL directed therapy\par 3)continue follow-up with pulmonary as scheduled\par 4)continue routine health maintenance and age appropriate screening as indicated\par 5)RTO in 6 months, sooner PRN\par \par Case discussed with Dr. Gastelum

## 2020-05-12 NOTE — HISTORY OF PRESENT ILLNESS
[Disease:__________________________] : Disease: [unfilled] [Home] : at home, [unfilled] , at the time of the visit. [Medical Office: ([facility])___] : at the medical office located in  [Self] : self [Patient] : the patient [FreeTextEntry2] : Heriberto Acosta [FreeTextEntry3] : at 10:00 am [de-identified] : This is a 69 year old male with CLL (CD23-) s/p splenectomy and FCR x 6 in 2009 presenting today for routine follow up via telehealth in era of COVID 19 pandemic.\par \par He has had chronic cough and continues to follow up pulmonary. He continues to use nasal irrigation and inhalers as needed. He mentions he is thinking about getting tested for COVID 19 only because he feels he has been around asymptomatic persons with with COVID 19.  No fevers or drenching night sweats.  Good appetite. Weight stable. No chest pain or shortness of breath. Energy stable.  [de-identified] : mutated\par CD38+, ZAP70 -\par nl  FISH [FreeTextEntry1] : Atypical CLL (CD23-), Dx 2002. s/p splenectomy in 2002. FCR x 6 (completed 3/09).

## 2020-05-12 NOTE — RESULTS/DATA
[FreeTextEntry1] : CBC 4/28/20:\par WBC 8.26\par ALC 3.03\par ANC  3.46\par Hgb 15.2\par Hct 47.0\par Plt 375

## 2020-05-13 ENCOUNTER — TRANSCRIPTION ENCOUNTER (OUTPATIENT)
Age: 70
End: 2020-05-13

## 2020-05-13 ENCOUNTER — APPOINTMENT (OUTPATIENT)
Dept: INTERNAL MEDICINE | Facility: CLINIC | Age: 70
End: 2020-05-13
Payer: MEDICARE

## 2020-05-13 PROCEDURE — 99442: CPT | Mod: CS,CR

## 2020-05-13 RX ORDER — HYDROCODONE BITARTRATE AND HOMATROPINE METHYLBROMIDE 5; 1.5 MG/5ML; MG/5ML
5-1.5 SYRUP ORAL
Qty: 1500 | Refills: 0 | Status: ACTIVE | COMMUNITY
Start: 2019-03-27 | End: 1900-01-01

## 2020-05-15 ENCOUNTER — TRANSCRIPTION ENCOUNTER (OUTPATIENT)
Age: 70
End: 2020-05-15

## 2020-05-16 ENCOUNTER — RX RENEWAL (OUTPATIENT)
Age: 70
End: 2020-05-16

## 2020-05-16 RX ORDER — AZELASTINE HYDROCHLORIDE 137 UG/1
0.1 SPRAY, METERED NASAL TWICE DAILY
Qty: 3 | Refills: 1 | Status: ACTIVE | COMMUNITY
Start: 2017-09-27 | End: 1900-01-01

## 2020-05-19 ENCOUNTER — TRANSCRIPTION ENCOUNTER (OUTPATIENT)
Age: 70
End: 2020-05-19

## 2020-05-21 ENCOUNTER — TRANSCRIPTION ENCOUNTER (OUTPATIENT)
Age: 70
End: 2020-05-21

## 2020-08-27 RX ORDER — PROMETHAZINE HYDROCHLORIDE AND DEXTROMETHORPHAN HYDROBROMIDE ORAL SOLUTION 15; 6.25 MG/5ML; MG/5ML
6.25-15 SOLUTION ORAL
Qty: 480 | Refills: 1 | Status: ACTIVE | COMMUNITY
Start: 2020-05-21 | End: 1900-01-01

## 2020-10-19 ENCOUNTER — APPOINTMENT (OUTPATIENT)
Dept: PULMONOLOGY | Facility: CLINIC | Age: 70
End: 2020-10-19
Payer: MEDICARE

## 2020-10-19 VITALS
HEIGHT: 68 IN | WEIGHT: 186 LBS | TEMPERATURE: 97.3 F | DIASTOLIC BLOOD PRESSURE: 70 MMHG | OXYGEN SATURATION: 97 % | RESPIRATION RATE: 16 BRPM | BODY MASS INDEX: 28.19 KG/M2 | SYSTOLIC BLOOD PRESSURE: 110 MMHG | HEART RATE: 59 BPM

## 2020-10-19 DIAGNOSIS — G47.30 SLEEP APNEA, UNSPECIFIED: ICD-10-CM

## 2020-10-19 DIAGNOSIS — K21.9 GASTRO-ESOPHAGEAL REFLUX DISEASE W/OUT ESOPHAGITIS: ICD-10-CM

## 2020-10-19 DIAGNOSIS — J82.83 EOSINOPHILIC ASTHMA: ICD-10-CM

## 2020-10-19 DIAGNOSIS — R09.82 POSTNASAL DRIP: ICD-10-CM

## 2020-10-19 DIAGNOSIS — J31.0 CHRONIC RHINITIS: ICD-10-CM

## 2020-10-19 DIAGNOSIS — R79.89 OTHER SPECIFIED ABNORMAL FINDINGS OF BLOOD CHEMISTRY: ICD-10-CM

## 2020-10-19 DIAGNOSIS — Z20.828 CONTACT WITH AND (SUSPECTED) EXPOSURE TO OTHER VIRAL COMMUNICABLE DISEASES: ICD-10-CM

## 2020-10-19 DIAGNOSIS — D72.10 EOSINOPHILIA, UNSPECIFIED: ICD-10-CM

## 2020-10-19 PROCEDURE — 99214 OFFICE O/P EST MOD 30 MIN: CPT | Mod: CS

## 2020-10-19 RX ORDER — DUPILUMAB 200 MG/1.14ML
200 INJECTION, SOLUTION SUBCUTANEOUS
Qty: 2.28 | Refills: 1 | Status: DISCONTINUED | COMMUNITY
Start: 2020-02-18 | End: 2020-10-19

## 2020-10-19 RX ORDER — DUPILUMAB 200 MG/1.14ML
200 INJECTION, SOLUTION SUBCUTANEOUS
Qty: 1 | Refills: 11 | Status: DISCONTINUED | COMMUNITY
Start: 2020-02-17 | End: 2020-10-19

## 2020-10-19 NOTE — ASSESSMENT
[FreeTextEntry1] : Mr. Acosta is a 70 year old male with a history of Eosinophilic Asthma, chronic sinusitis, RADs / allergy sinus / CLL / GERD  who presents to the office for a follow up visit. - PND aggregating Asthma (?Dupixent response) (now off Dupixent)\par \par His shortness of breath (quiet) is multifactorial due to:\par -post nasal drip syndrome\par -RADS\par -eosinophilic bronchitis/ Asthma \par -GERD\par -CLL: f/u with Dr. Martin and Dr. Trev Gastelum\par \par problem 1: post nasal drip- Chronic Sinusitis (stable)\par -continue to use saline\par -Continue Olopatadine 0.6% 1 sniff BID \par -Continue Flonase 1 sniff/nostril BID \par -continue Clarinex 5 mg before bed (or equivalent)\par -recommended to use "Navage" nasal rinse device \par -continue Palgic 4 mg up to TID \par \par -Environmental measures for allergies were encouraged including mattress and pillow cover, air purifier, and environmental controls.\par \par problem 2: eosinophilic bronchitis (Resolved)/ Eosinophilia 10/2018- Eosinophilic Asthma \par -Dupixent in place x2 months (now off)\par Dupixent is a prescription medicine used with other asthma medicines for the maintenance treatment of moderate-to-severe asthma in people aged 12 years and older whose asthma is not controlled with their current asthma medicines. Dupixent helps prevent severe asthma attacks (exacerbations) and can improve your breathing. Dupixent may also help reduce the amount of oral corticosteroids you need while preventing severe asthma attacks and improving your breathing. Dupixent is not used to treat sudden breathing problems. Risks and side effect of Dupixent were discussed and reviewed with patient.\par -being recommended to use Ventolin PRN and before exercise/Symbicort\par -continue Symbicort 160 at 2 inhalations BID prn\par -continue QVar 80 at 2 inhalations BID\par \par -Inhaler technique reviewed as well as oral hygiene techniques reviewed with patient. Avoidance of cold air, extremes of temperature, rescue inhaler should be used before exercise. Order of medication reviewed with patient. Recommended use of a cool mist humidifier in the bedroom. 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 It sometimes develops following a URI, herpes zoster outbreak in pharynx or thyroid or cervical spine injury. However, many patients have no identifiable antecedent explanation. \par Any cough greater than three weeks duration-differential diagnosis includes-asthma, upper airway cough syndrome, post nasal drip syndrome, gastroesophageal reflux, laryngopharyngeal reflux, cardiac disease (congestive heart failure, medicines, effects, etc), medication effects (b-blockers, ace inhibitors, ARBs, glaucoma meds, etc.), smoking, infectious, multifactorial, etc \par \par problem 4: GERD\par -Add Pepcid 40 mg QHS \par \par -Rule of 2s: avoid eating too much, eating too late, eating too spicy, eating two hours before bed\par -Things to avoid including overeating, spicy foods, tight clothing, eating within three hours of bed, this list is not all inclusive. \par -For treatment of reflux, possible options discussed including diet control, H2 blockers, PPIs, as well as coating motility agents discussed as treatment options. Timing of meals and proximity of last meal to sleep were discussed. If symptoms persist, a formal gastrointestinal evaluation is needed.\par \par problem 5: low vitamin D\par -continue to use supplemental vitamin D supplementation\par \par -Has been associated with asthma exacerbations and increased allergic symptoms. The goal based on recent information is maintaining levels between 50-70 and low normal is 30. Recommended 50,000 units every two weeks to once a month depending on the level. \par \par problem 6: OSAS\par -sleep apnea revealed moderate sleep apnea\par - Using oral appliance \par - S/p referral to Dr. Sivakumar Casarez and Dr. Okeefe\par \par -Treatment options discussed including CPAP/BiPAP machine, oral appliance, ProVent therapy, Oxy-Aid by Respitec, new technologies, or positional sleep.Recommended use of the CPAP machine for moderate (AHI >15), moderate to severe (AHI 15-30) and severe patients (AHI > 30). Recommended weight loss which can reduce AHI especially in weight loss of greater than 5% of BMI. Positional sleep is recommended in those with low AHI, low-moderate BMI, and younger age. For severe sleep apnea, the hypoglossal nerve stimulator was recommended as well. \par -Sleep apnea is associated with adverse clinical consequences which an affect most organ systems.  Cardiovascular disease risk includes arrhythmias, atrial fibrillation, hypertension, coronary artery disease, and stroke. Metabolic disorders include diabetes type 2, non-alcoholic fatty liver disease. Mood disorder especially depression; and cognitive decline especially in the elderly. Associations with  chronic reflux/Kyle’s esophagus some but not all inclusive. \par -Reasons to assess this include arousal consistent with hypopnea; respiratory events most prominent in REM sleep or supine position; therefore sleep staging and body position are important for accurate diagnosis and estimation of AHI. \par \par problem 7: overweight\par -Weight loss, exercise, and diet control were discussed and are highly encouraged. Treatment options were given such as, aqua therapy, and contacting a nutritionist. Recommended to use the elliptical, stationary bike, less use of treadmill.  Obesity is associated with worsening asthma, shortness of breath, and potential for cardiac disease, diabetes, and other underlying medical conditions. \par \par Problem 8: Health Maintenance/COVID19 Precautions:\par - Clean your hands often. Wash your hands often with soap and water for at least 20 seconds, especially after blowing your nose, coughing, or sneezing, or having been in a public place.\par - If soap and water are not available, use a hand  that contains at least 60% alcohol.\par - To the extent possible, avoid touching high-touch surfaces in public places - elevator buttons, door handles, handrails, handshaking with people, etc. Use a tissue or your sleeve to cover your hand or finger if you must touch something.\par - Wash your hands after touching surfaces in public places.\par - Avoid touching your face, nose, eyes, etc.\par - Clean and disinfect your home to remove germs: practice routine cleaning of frequently touched surfaces (for example: tables, doorknobs, light switches, handles, desks, toilets, faucets, sinks & cell phones)\par - Avoid crowds, especially in poorly ventilated spaces. Your risk of exposure to respiratory viruses like COVID-19 may increase in crowded, closed-in settings with little air circulation if\par there are people in the crowd who are sick. All patients are recommended to practice social distancing and stay at least 6 feet away from others.\par - Avoid all non-essential travel including plane trips, and especially avoid embarking on cruise ships.\par -If COVID-19 is spreading in your community, take extra measures to put distance between yourself and other people to further reduce your risk of being exposed to this new virus.\par -Stay home as much as possible.\par - Consider ways of getting food brought to your house through family, social, or commercial networks\par -Be aware that the virus has been known to live in the air up to 3 hours post exposure, cardboard up to 24 hours post exposure, copper up to 4 hours post exposure, steel and plastic up to 2-3 days post exposure. Risk of transmission from these surfaces are affected by many variables.\par \par Immune Support Recommendations:\par -OTC Vitamin C 500mg BID \par -OTC Quercetin 250-500mg BID \par -OTC Zinc 75-100mg per day \par -OTC Melatonin 1or 2mg a night \par -OTC Vitamin D 1-4000mg per day\par -Tonic Water 8oz\par -Recommended to Stay Hydrated (At least a gallon/day)\par \par Asthma and COVID19:\par You need to make sure your asthma is under control. This often requires the use of inhaled corticosteroids (and sometimes oral corticosteroids). Inhaled corticosteroids do not likely reduce your immune system’s ability to fight infections, but oral corticosteroids may. It is important to use the steps above to protect yourself to limit your exposure to any respiratory virus. \par \par problem 9: health maintenance \par - Received an influenza vaccine 2020\par - Received Prevnar-13 vaccine 2017\par - Received Pneumo vaccine 23 2018\par -recommended early intervention for URIs\par -recommended regular osteoporosis evaluations\par -recommended early dermatological evaluations\par -recommended after the age of 50 to the age of 70, colonoscopy every 5 years \par \par F/U in 6 months with SPI and NiOx\par He is encouraged to call with any changes, concerns, or questions.

## 2020-10-19 NOTE — PHYSICAL EXAM
[General Appearance - Well Developed] : well developed [Normal Appearance] : normal appearance [Well Groomed] : well groomed [General Appearance - Well Nourished] : well nourished [No Deformities] : no deformities [General Appearance - In No Acute Distress] : no acute distress [Normal Conjunctiva] : the conjunctiva exhibited no abnormalities [Eyelids - No Xanthelasma] : the eyelids demonstrated no xanthelasmas [Normal Oropharynx] : normal oropharynx [III] : III [Neck Appearance] : the appearance of the neck was normal [Neck Cervical Mass (___cm)] : no neck mass was observed [Jugular Venous Distention Increased] : there was no jugular-venous distention [Thyroid Diffuse Enlargement] : the thyroid was not enlarged [Thyroid Nodule] : there were no palpable thyroid nodules [Heart Rate And Rhythm] : heart rate and rhythm were normal [Heart Sounds] : normal S1 and S2 [Murmurs] : no murmurs present [Respiration, Rhythm And Depth] : normal respiratory rhythm and effort [Exaggerated Use Of Accessory Muscles For Inspiration] : no accessory muscle use [Auscultation Breath Sounds / Voice Sounds] : lungs were clear to auscultation bilaterally [Abdomen Soft] : soft [Abdomen Tenderness] : non-tender [Abnormal Walk] : normal gait [Abdomen Mass (___ Cm)] : no abdominal mass palpated [Gait - Sufficient For Exercise Testing] : the gait was sufficient for exercise testing [Nail Clubbing] : no clubbing of the fingernails [Petechial Hemorrhages (___cm)] : no petechial hemorrhages [Cyanosis, Localized] : no localized cyanosis [Skin Color & Pigmentation] : normal skin color and pigmentation [] : no rash [No Venous Stasis] : no venous stasis [Skin Lesions] : no skin lesions [No Skin Ulcers] : no skin ulcer [No Xanthoma] : no  xanthoma was observed [Sensation] : the sensory exam was normal to light touch and pinprick [Deep Tendon Reflexes (DTR)] : deep tendon reflexes were 2+ and symmetric [No Focal Deficits] : no focal deficits [Oriented To Time, Place, And Person] : oriented to person, place, and time [Impaired Insight] : insight and judgment were intact [Affect] : the affect was normal [FreeTextEntry1] : I:E ratio 1:3; clear

## 2020-10-19 NOTE — HISTORY OF PRESENT ILLNESS
[FreeTextEntry1] : Mr. Acosta is a 70 year old male with a history of allergic rhinitis, cough, asthma, eosinophilia, GERD, low vitamin D/testosterone, non allergic rhinitis, PND, sinus congestion, sleep apnea presenting to the office today for a follow up visit. Her chief complaint is \par \par -he notes working until the end of the year, set for alf\par -he notes exercising regularly\par -he notes energy levels are high\par -he notes weight is stable\par -he denies SOB\par -he notes increased mucus production, able to be expectorated exacerbated by allergy season\par -he notes dysphagia with foods\par -he notes cough exacerbated by dysphagia\par -he notes sleeping 8 hours a night on average\par -he notes sleeping well\par -he notes fatigue residual to OTC cough medicine\par \par -denies any headaches, nausea, vomiting, fever, chills, sweats, chest pain, chest pressure, diarrhea, constipation, dysphagia, dizziness, leg swelling, leg pain, myalgias, arthralgias, itchy eyes, itchy ears, heartburn, reflux, or sour taste in the mouth.\par

## 2020-10-19 NOTE — ADDENDUM
[FreeTextEntry1] : Documented by Jasiel Wilson acting as a scribe for Dr. Jose Martin Angulo on 10/19/2020.\par \par All medical record entries made by the Scribe were at my, Dr. Jose Martin Angulo's, direction and personally dictated by me on 10/19/2020 . I have reviewed the chart and agree that the record accurately reflects my personal performance of the history, physical exam, assessment and plan. I have also personally directed, reviewed, and agree with the discharge instructions. \par

## 2020-10-21 LAB
ALBUMIN SERPL ELPH-MCNC: 4.7 G/DL
ALP BLD-CCNC: 51 U/L
ALT SERPL-CCNC: 22 U/L
ANION GAP SERPL CALC-SCNC: 16 MMOL/L
AST SERPL-CCNC: 21 U/L
BASOPHILS # BLD AUTO: 0.1 K/UL
BASOPHILS NFR BLD AUTO: 1.5 %
BILIRUB SERPL-MCNC: 0.7 MG/DL
BUN SERPL-MCNC: 18 MG/DL
CALCIUM SERPL-MCNC: 11.1 MG/DL
CHLORIDE SERPL-SCNC: 101 MMOL/L
CO2 SERPL-SCNC: 27 MMOL/L
CREAT SERPL-MCNC: 0.81 MG/DL
EOSINOPHIL # BLD AUTO: 0.33 K/UL
EOSINOPHIL NFR BLD AUTO: 4.8 %
GLUCOSE SERPL-MCNC: 139 MG/DL
HCT VFR BLD CALC: 46 %
HGB BLD-MCNC: 14.7 G/DL
IMM GRANULOCYTES NFR BLD AUTO: 0.4 %
LDH SERPL-CCNC: 156 U/L
LYMPHOCYTES # BLD AUTO: 2.98 K/UL
LYMPHOCYTES NFR BLD AUTO: 43.8 %
MAN DIFF?: NORMAL
MCHC RBC-ENTMCNC: 32 GM/DL
MCHC RBC-ENTMCNC: 32.5 PG
MCV RBC AUTO: 101.5 FL
MONOCYTES # BLD AUTO: 0.57 K/UL
MONOCYTES NFR BLD AUTO: 8.4 %
NEUTROPHILS # BLD AUTO: 2.8 K/UL
NEUTROPHILS NFR BLD AUTO: 41.1 %
PLATELET # BLD AUTO: 392 K/UL
POTASSIUM SERPL-SCNC: 4.6 MMOL/L
PROT SERPL-MCNC: 6.7 G/DL
RBC # BLD: 4.53 M/UL
RBC # FLD: 13.9 %
SODIUM SERPL-SCNC: 144 MMOL/L
URATE SERPL-MCNC: 7.4 MG/DL
WBC # FLD AUTO: 6.81 K/UL

## 2020-10-21 RX ORDER — PROMETHAZINE HYDROCHLORIDE AND DEXTROMETHORPHAN HYDROBROMIDE ORAL SOLUTION 15; 6.25 MG/5ML; MG/5ML
6.25-15 SOLUTION ORAL
Qty: 1 | Refills: 1 | Status: ACTIVE | COMMUNITY
Start: 2020-10-21 | End: 1900-01-01

## 2020-10-21 RX ORDER — PROMETHAZINE HYDROCHLORIDE AND PHENYLEPHRINE HYDROCHLORIDE 6.25; 5 MG/5ML; MG/5ML
6.25-5 SYRUP ORAL
Qty: 400 | Refills: 1 | Status: DISCONTINUED | COMMUNITY
Start: 2020-10-19 | End: 2020-10-21

## 2020-10-23 DIAGNOSIS — Z01.812 ENCOUNTER FOR PREPROCEDURAL LABORATORY EXAMINATION: ICD-10-CM

## 2020-10-27 ENCOUNTER — RX RENEWAL (OUTPATIENT)
Age: 70
End: 2020-10-27

## 2020-10-30 ENCOUNTER — APPOINTMENT (OUTPATIENT)
Dept: HEMATOLOGY ONCOLOGY | Facility: CLINIC | Age: 70
End: 2020-10-30
Payer: MEDICARE

## 2020-10-30 PROCEDURE — 99212 OFFICE O/P EST SF 10 MIN: CPT | Mod: 95

## 2020-10-30 NOTE — PHYSICAL EXAM
[Fully active, able to carry on all pre-disease performance without restriction] : Status 0 - Fully active, able to carry on all pre-disease performance without restriction [Normal] : no peripheral adenopathy appreciated [de-identified] : no visible LAD

## 2020-10-30 NOTE — HISTORY OF PRESENT ILLNESS
[Disease:__________________________] : Disease: [unfilled] [Home] : at home, [unfilled] , at the time of the visit. [Medical Office: ([facility])___] : at the medical office located in  [Verbal consent obtained from patient] : the patient, [unfilled] [de-identified] : This is a 70 year old male with CLL (CD23-) s/p splenectomy and FCR x 6 in 2009 presenting today for routine follow up via telehealth in era of COVID 19 pandemic.\par \par He feels very well overall. He has currently moved to [place] and has settled. He has had a chronic cough and continues to follow up pulmonary. He continues to use nasal irrigation and inhalers as needed. No fevers or drenching night sweats.  Good appetite. Weight stable. No chest pain or shortness of breath. Energy stable.  [de-identified] : mutated\par CD38+, ZAP70 -\par nl  FISH [FreeTextEntry1] : Atypical CLL (CD23-), Dx 2002. s/p splenectomy in 2002. FCR x 6 (completed 3/09).

## 2020-10-30 NOTE — RESULTS/DATA
[FreeTextEntry1] : CBC 10/19/2020:\par WBC 6.81\par ALC 2.98\par ANC 2.80\par Hgb 14.7\par Hct 46.0\par Plt 392.0

## 2020-10-30 NOTE — ASSESSMENT
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 Gastelum

## 2020-12-30 ENCOUNTER — APPOINTMENT (OUTPATIENT)
Dept: PULMONOLOGY | Facility: CLINIC | Age: 70
End: 2020-12-30

## 2020-12-31 PROBLEM — D72.10 EOSINOPHILIA: Status: ACTIVE | Noted: 2017-04-06

## 2021-01-25 ENCOUNTER — RX RENEWAL (OUTPATIENT)
Age: 71
End: 2021-01-25

## 2021-01-25 RX ORDER — OXYBUTYNIN CHLORIDE 10 MG/1
10 TABLET, EXTENDED RELEASE ORAL
Qty: 90 | Refills: 0 | Status: ACTIVE | COMMUNITY
Start: 2019-10-11 | End: 1900-01-01

## 2021-05-12 ENCOUNTER — APPOINTMENT (OUTPATIENT)
Dept: PULMONOLOGY | Facility: CLINIC | Age: 71
End: 2021-05-12

## 2021-12-21 ENCOUNTER — NON-APPOINTMENT (OUTPATIENT)
Age: 71
End: 2021-12-21

## 2021-12-21 ENCOUNTER — APPOINTMENT (OUTPATIENT)
Dept: HEMATOLOGY ONCOLOGY | Facility: CLINIC | Age: 71
End: 2021-12-21
Payer: MEDICARE

## 2021-12-21 VITALS
TEMPERATURE: 98.5 F | RESPIRATION RATE: 16 BRPM | BODY MASS INDEX: 28.28 KG/M2 | WEIGHT: 186 LBS | OXYGEN SATURATION: 98 % | DIASTOLIC BLOOD PRESSURE: 80 MMHG | HEART RATE: 60 BPM | SYSTOLIC BLOOD PRESSURE: 120 MMHG

## 2021-12-21 PROCEDURE — 85025 COMPLETE CBC W/AUTO DIFF WBC: CPT

## 2021-12-21 PROCEDURE — 99213 OFFICE O/P EST LOW 20 MIN: CPT | Mod: 25

## 2021-12-21 NOTE — ASSESSMENT
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 Gastelum

## 2021-12-21 NOTE — PHYSICAL EXAM
[Fully active, able to carry on all pre-disease performance without restriction] : Status 0 - Fully active, able to carry on all pre-disease performance without restriction [Normal] : affect appropriate [de-identified] : no visible LAD

## 2021-12-21 NOTE — RESULTS/DATA
[FreeTextEntry1] : CBC 12/21/21:\par WBC 7.1\par ALC 2.62\par ANC 3.10\par Hgb 14.4\par Hct 42.3\par Plt 289.0

## 2021-12-21 NOTE — HISTORY OF PRESENT ILLNESS
[Disease:__________________________] : Disease: [unfilled] [de-identified] : This is a 71 year old male with CLL (CD23-) s/p splenectomy and FCR x 6 in 2009 presenting today for routine follow up. He currently lives in [place] and is visiting [place] today. \par \par He feels very well overall. He has currently moved to [place] and has settled. He has had a chronic cough and continues to follow up pulmonary. He completed a sleep study to r/o sleep apnea and is awaiting results. He continues to use nasal irrigation and inhalers as needed. No fevers or drenching night sweats.  Good appetite. Weight stable. No chest pain or shortness of breath. Energy stable.  [de-identified] : mutated\par CD38+, ZAP70 -\par nl  FISH [FreeTextEntry1] : Atypical CLL (CD23-), Dx 2002. s/p splenectomy in 2002. FCR x 6 (completed 3/09).

## 2021-12-22 LAB
ALBUMIN SERPL ELPH-MCNC: 4.4 G/DL
ALP BLD-CCNC: 57 U/L
ALT SERPL-CCNC: 24 U/L
ANION GAP SERPL CALC-SCNC: 10 MMOL/L
AST SERPL-CCNC: 19 U/L
BILIRUB SERPL-MCNC: 0.3 MG/DL
BUN SERPL-MCNC: 18 MG/DL
CALCIUM SERPL-MCNC: 10.9 MG/DL
CHLORIDE SERPL-SCNC: 106 MMOL/L
CO2 SERPL-SCNC: 27 MMOL/L
CREAT SERPL-MCNC: 0.89 MG/DL
GLUCOSE SERPL-MCNC: 90 MG/DL
LDH SERPL-CCNC: 160 U/L
MAGNESIUM SERPL-MCNC: 2.1 MG/DL
PHOSPHATE SERPL-MCNC: 2.9 MG/DL
POTASSIUM SERPL-SCNC: 4.7 MMOL/L
PROT SERPL-MCNC: 6.3 G/DL
SODIUM SERPL-SCNC: 142 MMOL/L
URATE SERPL-MCNC: 7.1 MG/DL

## 2022-03-30 ENCOUNTER — NON-APPOINTMENT (OUTPATIENT)
Age: 72
End: 2022-03-30

## 2022-12-15 ENCOUNTER — OUTPATIENT (OUTPATIENT)
Dept: OUTPATIENT SERVICES | Facility: HOSPITAL | Age: 72
LOS: 1 days | Discharge: HOME | End: 2022-12-15

## 2022-12-15 DIAGNOSIS — C91.90 LYMPHOID LEUKEMIA, UNSPECIFIED NOT HAVING ACHIEVED REMISSION: ICD-10-CM

## 2022-12-21 ENCOUNTER — RESULT REVIEW (OUTPATIENT)
Age: 72
End: 2022-12-21

## 2022-12-21 ENCOUNTER — APPOINTMENT (OUTPATIENT)
Dept: HEMATOLOGY ONCOLOGY | Facility: CLINIC | Age: 72
End: 2022-12-21

## 2022-12-21 VITALS
OXYGEN SATURATION: 99 % | BODY MASS INDEX: 29.33 KG/M2 | RESPIRATION RATE: 16 BRPM | HEART RATE: 49 BPM | TEMPERATURE: 97.2 F | SYSTOLIC BLOOD PRESSURE: 153 MMHG | WEIGHT: 192.88 LBS | DIASTOLIC BLOOD PRESSURE: 71 MMHG

## 2022-12-21 LAB
ALBUMIN SERPL ELPH-MCNC: 4.5 G/DL
ALP BLD-CCNC: 57 U/L
ALT SERPL-CCNC: 20 U/L
ANION GAP SERPL CALC-SCNC: 9 MMOL/L
AST SERPL-CCNC: 21 U/L
BASOPHILS # BLD AUTO: 0.11 K/UL — SIGNIFICANT CHANGE UP (ref 0–0.2)
BASOPHILS NFR BLD AUTO: 1.2 % — SIGNIFICANT CHANGE UP (ref 0–2)
BILIRUB SERPL-MCNC: 0.7 MG/DL
BUN SERPL-MCNC: 18 MG/DL
CALCIUM SERPL-MCNC: 11.2 MG/DL
CHLORIDE SERPL-SCNC: 104 MMOL/L
CO2 SERPL-SCNC: 29 MMOL/L
CREAT SERPL-MCNC: 0.87 MG/DL
EGFR: 93 ML/MIN/1.73M2
EOSINOPHIL # BLD AUTO: 0.36 K/UL — SIGNIFICANT CHANGE UP (ref 0–0.5)
EOSINOPHIL NFR BLD AUTO: 4.1 % — SIGNIFICANT CHANGE UP (ref 0–6)
GLUCOSE SERPL-MCNC: 131 MG/DL
HCT VFR BLD CALC: 44.3 % — SIGNIFICANT CHANGE UP (ref 39–50)
HGB BLD-MCNC: 14.9 G/DL — SIGNIFICANT CHANGE UP (ref 13–17)
IMM GRANULOCYTES NFR BLD AUTO: 0.3 % — SIGNIFICANT CHANGE UP (ref 0–0.9)
LDH SERPL-CCNC: 163 U/L
LYMPHOCYTES # BLD AUTO: 2.85 K/UL — SIGNIFICANT CHANGE UP (ref 1–3.3)
LYMPHOCYTES # BLD AUTO: 32.1 % — SIGNIFICANT CHANGE UP (ref 13–44)
MAGNESIUM SERPL-MCNC: 2 MG/DL
MCHC RBC-ENTMCNC: 33.1 PG — SIGNIFICANT CHANGE UP (ref 27–34)
MCHC RBC-ENTMCNC: 33.6 G/DL — SIGNIFICANT CHANGE UP (ref 32–36)
MCV RBC AUTO: 98.4 FL — SIGNIFICANT CHANGE UP (ref 80–100)
MONOCYTES # BLD AUTO: 0.78 K/UL — SIGNIFICANT CHANGE UP (ref 0–0.9)
MONOCYTES NFR BLD AUTO: 8.8 % — SIGNIFICANT CHANGE UP (ref 2–14)
NEUTROPHILS # BLD AUTO: 4.74 K/UL — SIGNIFICANT CHANGE UP (ref 1.8–7.4)
NEUTROPHILS NFR BLD AUTO: 53.5 % — SIGNIFICANT CHANGE UP (ref 43–77)
NRBC # BLD: 0 /100 WBCS — SIGNIFICANT CHANGE UP (ref 0–0)
PHOSPHATE SERPL-MCNC: 3.1 MG/DL
PLATELET # BLD AUTO: 363 K/UL — SIGNIFICANT CHANGE UP (ref 150–400)
POTASSIUM SERPL-SCNC: 4.3 MMOL/L
PROT SERPL-MCNC: 6.7 G/DL
RBC # BLD: 4.5 M/UL — SIGNIFICANT CHANGE UP (ref 4.2–5.8)
RBC # FLD: 13.9 % — SIGNIFICANT CHANGE UP (ref 10.3–14.5)
SODIUM SERPL-SCNC: 142 MMOL/L
URATE SERPL-MCNC: 7.4 MG/DL
WBC # BLD: 8.87 K/UL — SIGNIFICANT CHANGE UP (ref 3.8–10.5)
WBC # FLD AUTO: 8.87 K/UL — SIGNIFICANT CHANGE UP (ref 3.8–10.5)

## 2022-12-21 PROCEDURE — 99213 OFFICE O/P EST LOW 20 MIN: CPT

## 2022-12-21 NOTE — PHYSICAL EXAM
[Fully active, able to carry on all pre-disease performance without restriction] : Status 0 - Fully active, able to carry on all pre-disease performance without restriction [Normal] : affect appropriate [de-identified] : no visible LAD

## 2022-12-21 NOTE — HISTORY OF PRESENT ILLNESS
[Disease:__________________________] : Disease: [unfilled] [de-identified] : This is a 72 year old male with CLL (CD23-) s/p splenectomy and FCR x 6 in 2009 presenting today for routine follow up. He currently lives in [place] and is visiting [place] today. \par \par He feels very well overall. He has currently moved to [place] and has settled. His chronic cough has resolved and was to have acid reflux-was started on omeprazole. He completed a sleep study to r/o sleep apnea and is on CPAP machine. He continues to use nasal irrigation and inhalers as needed. No fevers or drenching night sweats.  Good appetite. Weight stable. No chest pain or shortness of breath. Energy stable.  [de-identified] : mutated\par CD38+, ZAP70 -\par nl  FISH [FreeTextEntry1] : Atypical CLL (CD23-), Dx 2002. s/p splenectomy in 2002. FCR x 6 (completed 3/09).

## 2022-12-21 NOTE — ASSESSMENT
[FreeTextEntry1] : 72 year old male with Atypical CLL CD23- s/p splenectomy and FCR in 2009 here for follow up visit.\par \par Plan:\par 1)CBC reviewed today, counts remain excellent\par 2)feels well, no indication for CLL directed therapy\par 3)continue follow-up with pulmonary as scheduled\par 4)continue routine health maintenance and age appropriate screening as indicated\par 5)RTO in 6 months-12 months, sooner PRN\par \par

## 2023-12-05 ENCOUNTER — OUTPATIENT (OUTPATIENT)
Dept: OUTPATIENT SERVICES | Facility: HOSPITAL | Age: 73
LOS: 1 days | Discharge: HOME | End: 2023-12-05

## 2023-12-05 DIAGNOSIS — C91.90 LYMPHOID LEUKEMIA, UNSPECIFIED NOT HAVING ACHIEVED REMISSION: ICD-10-CM

## 2023-12-20 ENCOUNTER — APPOINTMENT (OUTPATIENT)
Dept: HEMATOLOGY ONCOLOGY | Facility: CLINIC | Age: 73
End: 2023-12-20
Payer: MEDICARE

## 2023-12-20 ENCOUNTER — RESULT REVIEW (OUTPATIENT)
Age: 73
End: 2023-12-20

## 2023-12-20 VITALS
OXYGEN SATURATION: 99 % | BODY MASS INDEX: 28.93 KG/M2 | RESPIRATION RATE: 16 BRPM | HEART RATE: 48 BPM | SYSTOLIC BLOOD PRESSURE: 180 MMHG | WEIGHT: 190.26 LBS | TEMPERATURE: 97.3 F | DIASTOLIC BLOOD PRESSURE: 76 MMHG

## 2023-12-20 LAB
BASOPHILS # BLD AUTO: 0.1 K/UL — SIGNIFICANT CHANGE UP (ref 0–0.2)
BASOPHILS # BLD AUTO: 0.1 K/UL — SIGNIFICANT CHANGE UP (ref 0–0.2)
BASOPHILS NFR BLD AUTO: 1.3 % — SIGNIFICANT CHANGE UP (ref 0–2)
BASOPHILS NFR BLD AUTO: 1.3 % — SIGNIFICANT CHANGE UP (ref 0–2)
EOSINOPHIL # BLD AUTO: 0.33 K/UL — SIGNIFICANT CHANGE UP (ref 0–0.5)
EOSINOPHIL # BLD AUTO: 0.33 K/UL — SIGNIFICANT CHANGE UP (ref 0–0.5)
EOSINOPHIL NFR BLD AUTO: 4.2 % — SIGNIFICANT CHANGE UP (ref 0–6)
EOSINOPHIL NFR BLD AUTO: 4.2 % — SIGNIFICANT CHANGE UP (ref 0–6)
HCT VFR BLD CALC: 44.8 % — SIGNIFICANT CHANGE UP (ref 39–50)
HCT VFR BLD CALC: 44.8 % — SIGNIFICANT CHANGE UP (ref 39–50)
HGB BLD-MCNC: 15 G/DL — SIGNIFICANT CHANGE UP (ref 13–17)
HGB BLD-MCNC: 15 G/DL — SIGNIFICANT CHANGE UP (ref 13–17)
IMM GRANULOCYTES NFR BLD AUTO: 1.3 % — HIGH (ref 0–0.9)
IMM GRANULOCYTES NFR BLD AUTO: 1.3 % — HIGH (ref 0–0.9)
LYMPHOCYTES # BLD AUTO: 2.14 K/UL — SIGNIFICANT CHANGE UP (ref 1–3.3)
LYMPHOCYTES # BLD AUTO: 2.14 K/UL — SIGNIFICANT CHANGE UP (ref 1–3.3)
LYMPHOCYTES # BLD AUTO: 27.5 % — SIGNIFICANT CHANGE UP (ref 13–44)
LYMPHOCYTES # BLD AUTO: 27.5 % — SIGNIFICANT CHANGE UP (ref 13–44)
MCHC RBC-ENTMCNC: 32.7 PG — SIGNIFICANT CHANGE UP (ref 27–34)
MCHC RBC-ENTMCNC: 32.7 PG — SIGNIFICANT CHANGE UP (ref 27–34)
MCHC RBC-ENTMCNC: 33.5 G/DL — SIGNIFICANT CHANGE UP (ref 32–36)
MCHC RBC-ENTMCNC: 33.5 G/DL — SIGNIFICANT CHANGE UP (ref 32–36)
MCV RBC AUTO: 97.6 FL — SIGNIFICANT CHANGE UP (ref 80–100)
MCV RBC AUTO: 97.6 FL — SIGNIFICANT CHANGE UP (ref 80–100)
MONOCYTES # BLD AUTO: 1.02 K/UL — HIGH (ref 0–0.9)
MONOCYTES # BLD AUTO: 1.02 K/UL — HIGH (ref 0–0.9)
MONOCYTES NFR BLD AUTO: 13.1 % — SIGNIFICANT CHANGE UP (ref 2–14)
MONOCYTES NFR BLD AUTO: 13.1 % — SIGNIFICANT CHANGE UP (ref 2–14)
NEUTROPHILS # BLD AUTO: 4.1 K/UL — SIGNIFICANT CHANGE UP (ref 1.8–7.4)
NEUTROPHILS # BLD AUTO: 4.1 K/UL — SIGNIFICANT CHANGE UP (ref 1.8–7.4)
NEUTROPHILS NFR BLD AUTO: 52.6 % — SIGNIFICANT CHANGE UP (ref 43–77)
NEUTROPHILS NFR BLD AUTO: 52.6 % — SIGNIFICANT CHANGE UP (ref 43–77)
NRBC # BLD: 0 /100 WBCS — SIGNIFICANT CHANGE UP (ref 0–0)
NRBC # BLD: 0 /100 WBCS — SIGNIFICANT CHANGE UP (ref 0–0)
PLATELET # BLD AUTO: 326 K/UL — SIGNIFICANT CHANGE UP (ref 150–400)
PLATELET # BLD AUTO: 326 K/UL — SIGNIFICANT CHANGE UP (ref 150–400)
RBC # BLD: 4.59 M/UL — SIGNIFICANT CHANGE UP (ref 4.2–5.8)
RBC # BLD: 4.59 M/UL — SIGNIFICANT CHANGE UP (ref 4.2–5.8)
RBC # FLD: 14.3 % — SIGNIFICANT CHANGE UP (ref 10.3–14.5)
RBC # FLD: 14.3 % — SIGNIFICANT CHANGE UP (ref 10.3–14.5)
WBC # BLD: 7.79 K/UL — SIGNIFICANT CHANGE UP (ref 3.8–10.5)
WBC # BLD: 7.79 K/UL — SIGNIFICANT CHANGE UP (ref 3.8–10.5)
WBC # FLD AUTO: 7.79 K/UL — SIGNIFICANT CHANGE UP (ref 3.8–10.5)
WBC # FLD AUTO: 7.79 K/UL — SIGNIFICANT CHANGE UP (ref 3.8–10.5)

## 2023-12-20 PROCEDURE — 99213 OFFICE O/P EST LOW 20 MIN: CPT

## 2023-12-21 LAB
ALBUMIN SERPL ELPH-MCNC: 4.7 G/DL
ALP BLD-CCNC: 60 U/L
ALT SERPL-CCNC: 25 U/L
ANION GAP SERPL CALC-SCNC: 12 MMOL/L
AST SERPL-CCNC: 23 U/L
BILIRUB SERPL-MCNC: 0.5 MG/DL
BUN SERPL-MCNC: 16 MG/DL
CALCIUM SERPL-MCNC: 10.9 MG/DL
CHLORIDE SERPL-SCNC: 105 MMOL/L
CO2 SERPL-SCNC: 26 MMOL/L
CREAT SERPL-MCNC: 0.8 MG/DL
EGFR: 95 ML/MIN/1.73M2
GLUCOSE SERPL-MCNC: 98 MG/DL
LDH SERPL-CCNC: 194 U/L
POTASSIUM SERPL-SCNC: 4.9 MMOL/L
PROT SERPL-MCNC: 6.9 G/DL
SODIUM SERPL-SCNC: 143 MMOL/L
URATE SERPL-MCNC: 6.3 MG/DL

## 2024-01-11 NOTE — HISTORY OF PRESENT ILLNESS
[Disease:__________________________] : Disease: [unfilled] [de-identified] : This is a 72 year old male with CLL (CD23-) s/p splenectomy and FCR x 6 in 2009 presenting today for routine follow up. He currently lives in [place] and is visiting [place] today. \par  \par  He feels very well overall. He has currently moved to [place] and has settled. His chronic cough has resolved and was to have acid reflux-was started on omeprazole. He completed a sleep study to r/o sleep apnea and is on CPAP machine. He continues to use nasal irrigation and inhalers as needed. No fevers or drenching night sweats.  Good appetite. Weight stable. No chest pain or shortness of breath. Energy stable.  [de-identified] : mutated\par  CD38+, ZAP70 -\par  nl  FISH [FreeTextEntry1] : Atypical CLL (CD23-), Dx 2002. s/p splenectomy in 2002. FCR x 6 (completed 3/09).

## 2024-01-11 NOTE — PHYSICAL EXAM
[Fully active, able to carry on all pre-disease performance without restriction] : Status 0 - Fully active, able to carry on all pre-disease performance without restriction [Normal] : affect appropriate [de-identified] : no visible LAD

## 2024-01-11 NOTE — ASSESSMENT
[FreeTextEntry1] : 72 year old male with Atypical CLL CD23- s/p splenectomy and FCR in 2009 here for follow up visit.    Plan: #CLL: Atypical -CBC reviewed today, counts remain excellent. WBC 7.79 HGB 15  -CMP LDH and uric acid sent. Results pending. Will f/u -repeat flow and fish next visit. -no indication for CLL directed therapy  # routine health maintenance   -Continue age appropriate screening as indicated  5)RTO in 6 months-12 months, sooner PRN

## 2024-11-13 ENCOUNTER — NON-APPOINTMENT (OUTPATIENT)
Age: 74
End: 2024-11-13

## 2024-12-19 ENCOUNTER — OUTPATIENT (OUTPATIENT)
Dept: OUTPATIENT SERVICES | Facility: HOSPITAL | Age: 74
LOS: 1 days | Discharge: HOME | End: 2024-12-19

## 2024-12-19 DIAGNOSIS — C91.90 LYMPHOID LEUKEMIA, UNSPECIFIED NOT HAVING ACHIEVED REMISSION: ICD-10-CM

## 2024-12-20 ENCOUNTER — RESULT REVIEW (OUTPATIENT)
Age: 74
End: 2024-12-20

## 2024-12-20 ENCOUNTER — APPOINTMENT (OUTPATIENT)
Dept: HEMATOLOGY ONCOLOGY | Facility: CLINIC | Age: 74
End: 2024-12-20
Payer: MEDICARE

## 2024-12-20 VITALS
BODY MASS INDEX: 30.67 KG/M2 | TEMPERATURE: 96.8 F | HEART RATE: 50 BPM | SYSTOLIC BLOOD PRESSURE: 192 MMHG | WEIGHT: 201.72 LBS | RESPIRATION RATE: 16 BRPM | OXYGEN SATURATION: 96 % | DIASTOLIC BLOOD PRESSURE: 48 MMHG

## 2024-12-20 LAB
BASOPHILS # BLD AUTO: 0.12 K/UL — SIGNIFICANT CHANGE UP (ref 0–0.2)
BASOPHILS NFR BLD AUTO: 1.2 % — SIGNIFICANT CHANGE UP (ref 0–2)
EOSINOPHIL # BLD AUTO: 0.8 K/UL — HIGH (ref 0–0.5)
EOSINOPHIL NFR BLD AUTO: 8.2 % — HIGH (ref 0–6)
HCT VFR BLD CALC: 42.9 % — SIGNIFICANT CHANGE UP (ref 39–50)
HGB BLD-MCNC: 14.2 G/DL — SIGNIFICANT CHANGE UP (ref 13–17)
IMM GRANULOCYTES NFR BLD AUTO: 0.8 % — SIGNIFICANT CHANGE UP (ref 0–0.9)
LYMPHOCYTES # BLD AUTO: 4.04 K/UL — HIGH (ref 1–3.3)
LYMPHOCYTES # BLD AUTO: 41.3 % — SIGNIFICANT CHANGE UP (ref 13–44)
MCHC RBC-ENTMCNC: 32.1 PG — SIGNIFICANT CHANGE UP (ref 27–34)
MCHC RBC-ENTMCNC: 33.1 G/DL — SIGNIFICANT CHANGE UP (ref 32–36)
MCV RBC AUTO: 96.8 FL — SIGNIFICANT CHANGE UP (ref 80–100)
MONOCYTES # BLD AUTO: 1.44 K/UL — HIGH (ref 0–0.9)
MONOCYTES NFR BLD AUTO: 14.7 % — HIGH (ref 2–14)
NEUTROPHILS # BLD AUTO: 3.31 K/UL — SIGNIFICANT CHANGE UP (ref 1.8–7.4)
NEUTROPHILS NFR BLD AUTO: 33.8 % — LOW (ref 43–77)
NRBC # BLD: 0 /100 WBCS — SIGNIFICANT CHANGE UP (ref 0–0)
NRBC BLD-RTO: 0 /100 WBCS — SIGNIFICANT CHANGE UP (ref 0–0)
PLATELET # BLD AUTO: 347 K/UL — SIGNIFICANT CHANGE UP (ref 150–400)
RBC # BLD: 4.43 M/UL — SIGNIFICANT CHANGE UP (ref 4.2–5.8)
RBC # FLD: 14.6 % — HIGH (ref 10.3–14.5)
WBC # BLD: 9.79 K/UL — SIGNIFICANT CHANGE UP (ref 3.8–10.5)
WBC # FLD AUTO: 9.79 K/UL — SIGNIFICANT CHANGE UP (ref 3.8–10.5)

## 2024-12-20 PROCEDURE — 99214 OFFICE O/P EST MOD 30 MIN: CPT

## 2024-12-24 LAB
ALBUMIN SERPL ELPH-MCNC: 4.2 G/DL
ALP BLD-CCNC: 56 U/L
ALT SERPL-CCNC: 21 U/L
ANION GAP SERPL CALC-SCNC: 9 MMOL/L
AST SERPL-CCNC: 15 U/L
BILIRUB SERPL-MCNC: 0.4 MG/DL
BUN SERPL-MCNC: 22 MG/DL
CALCIUM SERPL-MCNC: 10.8 MG/DL
CHLORIDE SERPL-SCNC: 104 MMOL/L
CHOLEST SERPL-MCNC: 201 MG/DL
CO2 SERPL-SCNC: 29 MMOL/L
CREAT SERPL-MCNC: 0.89 MG/DL
EGFR: 91 ML/MIN/1.73M2
GLUCOSE SERPL-MCNC: 92 MG/DL
HDLC SERPL-MCNC: 45 MG/DL
LDH SERPL-CCNC: 174 U/L
LDLC SERPL CALC-MCNC: 115 MG/DL
NONHDLC SERPL-MCNC: 156 MG/DL
POTASSIUM SERPL-SCNC: 4.4 MMOL/L
PROT SERPL-MCNC: 6.4 G/DL
SODIUM SERPL-SCNC: 142 MMOL/L
TRIGL SERPL-MCNC: 233 MG/DL